# Patient Record
Sex: MALE | Race: BLACK OR AFRICAN AMERICAN | HISPANIC OR LATINO | Employment: FULL TIME | URBAN - METROPOLITAN AREA
[De-identification: names, ages, dates, MRNs, and addresses within clinical notes are randomized per-mention and may not be internally consistent; named-entity substitution may affect disease eponyms.]

---

## 2022-02-08 ENCOUNTER — HOSPITAL ENCOUNTER (EMERGENCY)
Facility: HOSPITAL | Age: 42
Discharge: HOME | End: 2022-02-08
Attending: EMERGENCY MEDICINE
Payer: OTHER GOVERNMENT

## 2022-02-08 VITALS
DIASTOLIC BLOOD PRESSURE: 87 MMHG | RESPIRATION RATE: 14 BRPM | SYSTOLIC BLOOD PRESSURE: 143 MMHG | HEART RATE: 83 BPM | TEMPERATURE: 98 F | OXYGEN SATURATION: 98 %

## 2022-02-08 DIAGNOSIS — R07.89 LEFT-SIDED CHEST WALL PAIN: Primary | ICD-10-CM

## 2022-02-08 DIAGNOSIS — Z20.822 COVID-19 VIRUS NOT DETECTED: ICD-10-CM

## 2022-02-08 DIAGNOSIS — R00.2 PALPITATIONS: ICD-10-CM

## 2022-02-08 LAB
ALBUMIN SERPL BCP-MCNC: 4.3 G/DL (ref 3.5–5.2)
ALP SERPL-CCNC: 54 U/L (ref 55–135)
ALT SERPL W/O P-5'-P-CCNC: 44 U/L (ref 10–44)
ANION GAP SERPL CALC-SCNC: 10 MMOL/L (ref 8–16)
AST SERPL-CCNC: 36 U/L (ref 10–40)
BASOPHILS # BLD AUTO: 0.04 K/UL (ref 0–0.2)
BASOPHILS NFR BLD: 0.7 % (ref 0–1.9)
BILIRUB SERPL-MCNC: 0.3 MG/DL (ref 0.1–1)
BNP SERPL-MCNC: <10 PG/ML (ref 0–99)
BUN SERPL-MCNC: 16 MG/DL (ref 6–20)
CALCIUM SERPL-MCNC: 9.4 MG/DL (ref 8.7–10.5)
CHLORIDE SERPL-SCNC: 105 MMOL/L (ref 95–110)
CO2 SERPL-SCNC: 23 MMOL/L (ref 23–29)
CREAT SERPL-MCNC: 1.4 MG/DL (ref 0.5–1.4)
CTP QC/QA: YES
DIFFERENTIAL METHOD: ABNORMAL
EOSINOPHIL # BLD AUTO: 0.2 K/UL (ref 0–0.5)
EOSINOPHIL NFR BLD: 3.1 % (ref 0–8)
ERYTHROCYTE [DISTWIDTH] IN BLOOD BY AUTOMATED COUNT: 15.2 % (ref 11.5–14.5)
EST. GFR  (AFRICAN AMERICAN): >60 ML/MIN/1.73 M^2
EST. GFR  (NON AFRICAN AMERICAN): >60 ML/MIN/1.73 M^2
GLUCOSE SERPL-MCNC: 140 MG/DL (ref 70–110)
HCT VFR BLD AUTO: 36.8 % (ref 40–54)
HCV AB SERPL QL IA: NEGATIVE
HGB BLD-MCNC: 12.1 G/DL (ref 14–18)
HIV 1+2 AB+HIV1 P24 AG SERPL QL IA: NEGATIVE
IMM GRANULOCYTES # BLD AUTO: 0.04 K/UL (ref 0–0.04)
IMM GRANULOCYTES NFR BLD AUTO: 0.7 % (ref 0–0.5)
LYMPHOCYTES # BLD AUTO: 1.7 K/UL (ref 1–4.8)
LYMPHOCYTES NFR BLD: 30.9 % (ref 18–48)
MCH RBC QN AUTO: 26.1 PG (ref 27–31)
MCHC RBC AUTO-ENTMCNC: 32.9 G/DL (ref 32–36)
MCV RBC AUTO: 79 FL (ref 82–98)
MONOCYTES # BLD AUTO: 0.7 K/UL (ref 0.3–1)
MONOCYTES NFR BLD: 11.8 % (ref 4–15)
NEUTROPHILS # BLD AUTO: 2.9 K/UL (ref 1.8–7.7)
NEUTROPHILS NFR BLD: 52.8 % (ref 38–73)
NRBC BLD-RTO: 0 /100 WBC
PLATELET # BLD AUTO: 203 K/UL (ref 150–450)
PMV BLD AUTO: 10.3 FL (ref 9.2–12.9)
POTASSIUM SERPL-SCNC: 4.1 MMOL/L (ref 3.5–5.1)
PROT SERPL-MCNC: 8.1 G/DL (ref 6–8.4)
RBC # BLD AUTO: 4.64 M/UL (ref 4.6–6.2)
SARS-COV-2 RDRP RESP QL NAA+PROBE: NEGATIVE
SODIUM SERPL-SCNC: 138 MMOL/L (ref 136–145)
TROPONIN I SERPL DL<=0.01 NG/ML-MCNC: 0.01 NG/ML (ref 0–0.03)
WBC # BLD AUTO: 5.5 K/UL (ref 3.9–12.7)

## 2022-02-08 PROCEDURE — 99285 EMERGENCY DEPT VISIT HI MDM: CPT | Mod: 25

## 2022-02-08 PROCEDURE — 93010 ELECTROCARDIOGRAM REPORT: CPT | Mod: ,,, | Performed by: INTERNAL MEDICINE

## 2022-02-08 PROCEDURE — 83880 ASSAY OF NATRIURETIC PEPTIDE: CPT | Performed by: EMERGENCY MEDICINE

## 2022-02-08 PROCEDURE — 93010 EKG 12-LEAD: ICD-10-PCS | Mod: ,,, | Performed by: INTERNAL MEDICINE

## 2022-02-08 PROCEDURE — 93005 ELECTROCARDIOGRAM TRACING: CPT

## 2022-02-08 PROCEDURE — 99285 EMERGENCY DEPT VISIT HI MDM: CPT | Mod: CS,,, | Performed by: EMERGENCY MEDICINE

## 2022-02-08 PROCEDURE — 80053 COMPREHEN METABOLIC PANEL: CPT | Performed by: EMERGENCY MEDICINE

## 2022-02-08 PROCEDURE — 85025 COMPLETE CBC W/AUTO DIFF WBC: CPT | Performed by: EMERGENCY MEDICINE

## 2022-02-08 PROCEDURE — 99285 PR EMERGENCY DEPT VISIT,LEVEL V: ICD-10-PCS | Mod: CS,,, | Performed by: EMERGENCY MEDICINE

## 2022-02-08 PROCEDURE — 25000003 PHARM REV CODE 250: Performed by: EMERGENCY MEDICINE

## 2022-02-08 PROCEDURE — 86803 HEPATITIS C AB TEST: CPT | Performed by: EMERGENCY MEDICINE

## 2022-02-08 PROCEDURE — 84484 ASSAY OF TROPONIN QUANT: CPT | Performed by: EMERGENCY MEDICINE

## 2022-02-08 PROCEDURE — 87389 HIV-1 AG W/HIV-1&-2 AB AG IA: CPT | Performed by: EMERGENCY MEDICINE

## 2022-02-08 PROCEDURE — U0002 COVID-19 LAB TEST NON-CDC: HCPCS | Performed by: EMERGENCY MEDICINE

## 2022-02-08 RX ORDER — NAPROXEN 500 MG/1
500 TABLET ORAL
Status: COMPLETED | OUTPATIENT
Start: 2022-02-08 | End: 2022-02-08

## 2022-02-08 RX ORDER — BACLOFEN 10 MG/1
20 TABLET ORAL
Status: COMPLETED | OUTPATIENT
Start: 2022-02-08 | End: 2022-02-08

## 2022-02-08 RX ADMIN — NAPROXEN 500 MG: 500 TABLET ORAL at 02:02

## 2022-02-08 RX ADMIN — BACLOFEN 20 MG: 10 TABLET ORAL at 02:02

## 2022-02-08 NOTE — ED PROVIDER NOTES
Encounter Date: 2/8/2022       History     Chief Complaint   Patient presents with    Chest Pain     Pt reports 10/10 L sided chest pain that started 1 hour PTA. Denies n/v.      HPI   43 Y/O healthy  non-smoker M C/O localized, reproducible to palpation and movement, sharp left-sided chest pain with no associated dyspnea, diaphoresis, nausea/vomiting or palpitations.  He denies any migratory back pain.  No reported history of PE or DVT.  He reports pain is aggravated versus elicited to torso movement and palpation of site.  He does report nasal congestion with nonpurulent rhinorrhea, sore throat, with no change in voice, drooling or dysphagia to solids or liquids and cough productive of clear sputum, which has aggravated his chest pain.  Prior to symptom onset, he denies any chest pain on exertion, dyspnea on exertion or decreased exercise tolerance.  No family history of sudden death.    Review of patient's allergies indicates:  No Known Allergies  No past medical history on file.  No past surgical history on file.  No family history on file.     Review of Systems   Constitutional: Negative for chills, diaphoresis, fatigue, fever and unexpected weight change.   HENT: Positive for congestion, sinus pressure and sore throat. Negative for drooling, ear discharge, ear pain, facial swelling, nosebleeds, tinnitus and trouble swallowing.    Eyes: Negative for photophobia, pain, discharge and visual disturbance.   Respiratory: Positive for cough. Negative for chest tightness, shortness of breath and stridor.    Cardiovascular: Positive for chest pain. Negative for palpitations and leg swelling.   Gastrointestinal: Negative for abdominal pain, constipation, diarrhea and nausea.   Genitourinary: Negative for difficulty urinating, penile pain and penile swelling.   Musculoskeletal: Negative for arthralgias, gait problem, joint swelling and neck stiffness.   Skin: Negative for color change, rash and wound.    Neurological: Negative for dizziness and headaches.       Physical Exam     Initial Vitals [02/08/22 0144]   BP Pulse Resp Temp SpO2   (!) 143/87 83 14 97.9 °F (36.6 °C) 98 %      MAP       --         Physical Exam    Constitutional: He appears well-developed and well-nourished. He is not diaphoretic. No distress.   HENT:   Head: Normocephalic and atraumatic.   Eyes: Conjunctivae are normal. Right eye exhibits no discharge. Left eye exhibits no discharge. No scleral icterus.   Cardiovascular: Normal rate, regular rhythm and intact distal pulses.   No murmur heard.  Pulmonary/Chest: No stridor. No respiratory distress. He has no wheezes. He has no rhonchi. He has no rales. He exhibits no tenderness.   Abdominal: Abdomen is soft. Bowel sounds are normal. There is no abdominal tenderness.   Musculoskeletal:         General: Normal range of motion.     Neurological: He is alert and oriented to person, place, and time.   Skin: Skin is warm and dry. No rash noted.         ED Course   Procedures  Labs Reviewed   CBC W/ AUTO DIFFERENTIAL - Abnormal; Notable for the following components:       Result Value    Hemoglobin 12.1 (*)     Hematocrit 36.8 (*)     MCV 79 (*)     MCH 26.1 (*)     RDW 15.2 (*)     Immature Granulocytes 0.7 (*)     All other components within normal limits   COMPREHENSIVE METABOLIC PANEL - Abnormal; Notable for the following components:    Glucose 140 (*)     Alkaline Phosphatase 54 (*)     All other components within normal limits   TROPONIN I   B-TYPE NATRIURETIC PEPTIDE   HIV 1 / 2 ANTIBODY    Narrative:     Release to patient->Immediate   HEPATITIS C ANTIBODY    Narrative:     Release to patient->Immediate   SARS-COV-2 RDRP GENE    Narrative:     This test utilizes isothermal nucleic acid amplification   technology to detect the SARS-CoV-2 RdRp nucleic acid segment.   The analytical sensitivity (limit of detection) is 125 genome   equivalents/mL.   A POSITIVE result implies infection with the  SARS-CoV-2 virus;   the patient is presumed to be contagious.     A NEGATIVE result means that SARS-CoV-2 nucleic acids are not   present above the limit of detection. A NEGATIVE result should be   treated as presumptive. It does not rule out the possibility of   COVID-19 and should not be the sole basis for treatment decisions.   If COVID-19 is strongly suspected based on clinical and exposure   history, re-testing using an alternate molecular assay should be   considered.   This test is only for use under the Food and Drug   Administration s Emergency Use Authorization (EUA).   Commercial kits are provided by [employer].   Performance characteristics of the EUA have been independently   verified by [facility] Department of   Pathology and Laboratory Medicine.   _________________________________________________________________   The authorized Fact Sheet for Healthcare Providers and the authorized Fact   Sheet for Patients of the ID NOW COVID-19 are available on the FDA   website:     https://www.fda.gov/media/972772/download  https://www.fda.gov/media/929787/download         EKG Readings: (Independently Interpreted)   Initial Reading: No STEMI.   Sinus rhythm at a rate of 67 beats per minute with normal ventricular axis; AK/QRS/QTC interval within normal limits with no STEMI.     ECG Results          EKG 12-lead (Final result)  Result time 02/08/22 21:51:32    Final result by Interface, [facility] (02/08/22 21:51:32)                 Narrative:    Test Reason : R07.9,    Vent. Rate : 067 BPM     Atrial Rate : 067 BPM     P-R Int : 150 ms          QRS Dur : 090 ms      QT Int : 368 ms       P-R-T Axes : 053 076 007 degrees     QTc Int : 388 ms    Normal sinus rhythm  Nonspecific ST abnormality  Abnormal ECG  No previous ECGs available  Confirmed by Leoncio BRITT, Ladarius MUNIZ ([id number]) on 2/8/2022 9:51:22 PM    Referred By: [id number]   SELF           Confirmed By:Ladarius Fang MD                         "    Imaging Results          X-Ray Chest PA And Lateral (Final result)  Result time 02/08/22 02:57:06    Final result by Sammy Bradford MD (02/08/22 02:57:06)                 Impression:      No acute cardiopulmonary finding.      Electronically signed by: Sammy Bradford MD  Date:    02/08/2022  Time:    02:57             Narrative:    EXAMINATION:  XR CHEST PA AND LATERAL    CLINICAL HISTORY:  Provided history is "Chest Pain;  ".    TECHNIQUE:  Frontal and lateral views of the chest were performed.    COMPARISON:  None.    FINDINGS:  Cardiac silhouette is not enlarged. No focal consolidation.  No sizable pleural effusion.  No pneumothorax.                                 Medications   naproxen tablet 500 mg (500 mg Oral Given 2/8/22 0227)   baclofen tablet 20 mg (20 mg Oral Given 2/8/22 0227)     Medical Decision Making:   History:   Old Medical Records: I decided to obtain old medical records.  Initial Assessment:   Afebrile, atraumatic and hemodynamically stable male presents with signs and symptoms of left-sided chest wall pain.  Unlikely acute coronary syndrome with no STEMI on ECG that may warrant emergent PCI cardiology evaluation.  No hypoxia, dyspnea or tachypnea.  No suspicion for PE as etiology to his reproducible chest wall pain.  Click lungs clear to auscultation with no wheezing and good inspiratory/expiratory effort.  No suspicion for thoracic aortic dissection.  Will provide NSAIDs, obtain labs given the patient's deep concern, but I anticipate outpatient disposition.  He additionally reports non acute and intermittent episodes of palpitation with no syncopal episodes, for which I will refer to Cardiology as an outpatient for event monitor.  Clinical Tests:   Lab Tests: Ordered and Reviewed  Radiological Study: Ordered and Reviewed  Medical Tests: Ordered and Reviewed                      Clinical Impression:   Final diagnoses:  [R07.89] Left-sided chest wall pain (Primary)  [R00.2] " Palpitations  [Z20.822] COVID-19 virus not detected          ED Disposition Condition    Discharge Stable        ED Prescriptions     None        Follow-up Information     Follow up With Specialties Details Why Contact Info    [facility] - [facility] Emergency Medicine  If you develop worsening chest pain with shortness of breath, sweating or any new concerns [address]  [facility] [address]  [phone]           Miguelito Leon MD  02/12/22 0548

## 2022-02-08 NOTE — DISCHARGE INSTRUCTIONS
"Imaging Results              X-Ray Chest PA And Lateral (Final result)  Result time 02/08/22 02:57:06      Final result by Sammy Bradford MD (02/08/22 02:57:06)                   Impression:      No acute cardiopulmonary finding.      Electronically signed by: Sammy Bradford MD  Date:    02/08/2022  Time:    02:57               Narrative:    EXAMINATION:  XR CHEST PA AND LATERAL    CLINICAL HISTORY:  Provided history is "Chest Pain;  ".    TECHNIQUE:  Frontal and lateral views of the chest were performed.    COMPARISON:  None.    FINDINGS:  Cardiac silhouette is not enlarged. No focal consolidation.  No sizable pleural effusion.  No pneumothorax.                                      "

## 2023-09-02 ENCOUNTER — HOSPITAL ENCOUNTER (EMERGENCY)
Facility: HOSPITAL | Age: 43
Discharge: HOME | End: 2023-09-02
Attending: EMERGENCY MEDICINE

## 2023-09-02 VITALS
DIASTOLIC BLOOD PRESSURE: 61 MMHG | WEIGHT: 190 LBS | OXYGEN SATURATION: 98 % | HEIGHT: 66 IN | RESPIRATION RATE: 16 BRPM | BODY MASS INDEX: 30.53 KG/M2 | HEART RATE: 63 BPM | TEMPERATURE: 98 F | SYSTOLIC BLOOD PRESSURE: 134 MMHG

## 2023-09-02 DIAGNOSIS — S39.012A LUMBAR STRAIN, INITIAL ENCOUNTER: Primary | ICD-10-CM

## 2023-09-02 DIAGNOSIS — M54.50 ACUTE BILATERAL LOW BACK PAIN WITHOUT SCIATICA: ICD-10-CM

## 2023-09-02 LAB
ALBUMIN SERPL BCP-MCNC: 4.3 G/DL (ref 3.5–5.2)
ALP SERPL-CCNC: 53 U/L (ref 55–135)
ALT SERPL W/O P-5'-P-CCNC: 52 U/L (ref 10–44)
ANION GAP SERPL CALC-SCNC: 10 MMOL/L (ref 8–16)
AST SERPL-CCNC: 41 U/L (ref 10–40)
BASOPHILS # BLD AUTO: 0.04 K/UL (ref 0–0.2)
BASOPHILS NFR BLD: 0.7 % (ref 0–1.9)
BILIRUB SERPL-MCNC: 0.7 MG/DL (ref 0.1–1)
BILIRUB UR QL STRIP: NEGATIVE
BUN SERPL-MCNC: 20 MG/DL (ref 6–20)
CALCIUM SERPL-MCNC: 9.7 MG/DL (ref 8.7–10.5)
CHLORIDE SERPL-SCNC: 105 MMOL/L (ref 95–110)
CLARITY UR REFRACT.AUTO: CLEAR
CO2 SERPL-SCNC: 25 MMOL/L (ref 23–29)
COLOR UR AUTO: NORMAL
CREAT SERPL-MCNC: 1.2 MG/DL (ref 0.5–1.4)
DIFFERENTIAL METHOD: ABNORMAL
EOSINOPHIL # BLD AUTO: 0.1 K/UL (ref 0–0.5)
EOSINOPHIL NFR BLD: 1.5 % (ref 0–8)
ERYTHROCYTE [DISTWIDTH] IN BLOOD BY AUTOMATED COUNT: 14.9 % (ref 11.5–14.5)
EST. GFR  (NO RACE VARIABLE): >60 ML/MIN/1.73 M^2
GLUCOSE SERPL-MCNC: 80 MG/DL (ref 70–110)
GLUCOSE UR QL STRIP: NEGATIVE
HCT VFR BLD AUTO: 38.4 % (ref 40–54)
HCV AB SERPL QL IA: NORMAL
HGB BLD-MCNC: 12.8 G/DL (ref 14–18)
HGB UR QL STRIP: NEGATIVE
HIV 1+2 AB+HIV1 P24 AG SERPL QL IA: NORMAL
IMM GRANULOCYTES # BLD AUTO: 0.01 K/UL (ref 0–0.04)
IMM GRANULOCYTES NFR BLD AUTO: 0.2 % (ref 0–0.5)
KETONES UR QL STRIP: NEGATIVE
LEUKOCYTE ESTERASE UR QL STRIP: NEGATIVE
LYMPHOCYTES # BLD AUTO: 1.6 K/UL (ref 1–4.8)
LYMPHOCYTES NFR BLD: 28.9 % (ref 18–48)
MCH RBC QN AUTO: 27.6 PG (ref 27–31)
MCHC RBC AUTO-ENTMCNC: 33.3 G/DL (ref 32–36)
MCV RBC AUTO: 83 FL (ref 82–98)
MONOCYTES # BLD AUTO: 0.5 K/UL (ref 0.3–1)
MONOCYTES NFR BLD: 8.4 % (ref 4–15)
NEUTROPHILS # BLD AUTO: 3.2 K/UL (ref 1.8–7.7)
NEUTROPHILS NFR BLD: 60.3 % (ref 38–73)
NITRITE UR QL STRIP: NEGATIVE
NRBC BLD-RTO: 0 /100 WBC
PH UR STRIP: 5 [PH] (ref 5–8)
PLATELET # BLD AUTO: 165 K/UL (ref 150–450)
PMV BLD AUTO: 11.1 FL (ref 9.2–12.9)
POTASSIUM SERPL-SCNC: 4.3 MMOL/L (ref 3.5–5.1)
PROT SERPL-MCNC: 7.4 G/DL (ref 6–8.4)
PROT UR QL STRIP: NEGATIVE
RBC # BLD AUTO: 4.64 M/UL (ref 4.6–6.2)
SODIUM SERPL-SCNC: 140 MMOL/L (ref 136–145)
SP GR UR STRIP: 1.01 (ref 1–1.03)
URN SPEC COLLECT METH UR: NORMAL
WBC # BLD AUTO: 5.36 K/UL (ref 3.9–12.7)

## 2023-09-02 PROCEDURE — 81003 URINALYSIS AUTO W/O SCOPE: CPT | Performed by: EMERGENCY MEDICINE

## 2023-09-02 PROCEDURE — 87389 HIV-1 AG W/HIV-1&-2 AB AG IA: CPT | Performed by: PHYSICIAN ASSISTANT

## 2023-09-02 PROCEDURE — 86803 HEPATITIS C AB TEST: CPT | Performed by: PHYSICIAN ASSISTANT

## 2023-09-02 PROCEDURE — 80053 COMPREHEN METABOLIC PANEL: CPT | Performed by: EMERGENCY MEDICINE

## 2023-09-02 PROCEDURE — 96374 THER/PROPH/DIAG INJ IV PUSH: CPT

## 2023-09-02 PROCEDURE — 85025 COMPLETE CBC W/AUTO DIFF WBC: CPT | Performed by: EMERGENCY MEDICINE

## 2023-09-02 PROCEDURE — 96361 HYDRATE IV INFUSION ADD-ON: CPT

## 2023-09-02 PROCEDURE — 99285 EMERGENCY DEPT VISIT HI MDM: CPT | Mod: 25

## 2023-09-02 PROCEDURE — 63600175 PHARM REV CODE 636 W HCPCS: Performed by: EMERGENCY MEDICINE

## 2023-09-02 RX ORDER — CYCLOBENZAPRINE HCL 10 MG
10 TABLET ORAL 3 TIMES DAILY PRN
Qty: 15 TABLET | Refills: 0 | Status: SHIPPED | OUTPATIENT
Start: 2023-09-02 | End: 2023-09-07

## 2023-09-02 RX ORDER — IBUPROFEN 600 MG/1
600 TABLET ORAL EVERY 6 HOURS PRN
Qty: 20 TABLET | Refills: 0 | Status: SHIPPED | OUTPATIENT
Start: 2023-09-02

## 2023-09-02 RX ORDER — KETOROLAC TROMETHAMINE 30 MG/ML
10 INJECTION, SOLUTION INTRAMUSCULAR; INTRAVENOUS
Status: COMPLETED | OUTPATIENT
Start: 2023-09-02 | End: 2023-09-02

## 2023-09-02 RX ADMIN — SODIUM CHLORIDE, POTASSIUM CHLORIDE, SODIUM LACTATE AND CALCIUM CHLORIDE 1000 ML: 600; 310; 30; 20 INJECTION, SOLUTION INTRAVENOUS at 09:09

## 2023-09-02 RX ADMIN — KETOROLAC TROMETHAMINE 10 MG: 30 INJECTION, SOLUTION INTRAMUSCULAR; INTRAVENOUS at 09:09

## 2023-09-02 NOTE — DISCHARGE INSTRUCTIONS
Use warm compresses to help relieve the pain in your back.  Take ibuprofen every 6 hours as needed for pain.  If the pain is present despite ibuprofen, you may use cyclobenzaprine.  This is a muscle relaxant and will make you drowsy so do not take it before driving or operating heavy machinery.  Return to the ER for any severe pain, fever, weakness, numbness, difficulty controlling your bowels or bladder.

## 2023-09-02 NOTE — ED PROVIDER NOTES
Encounter Date: 9/2/2023       History     Chief Complaint   Patient presents with    Back Pain     Bilateral lower back pain x 2 days, pain in lower back when you pee     44 yo M with pmhx pre-DM presents with a chief complaint of LBP. Patient reports pain in bilat lower back. Onset yesterday AM. Contant.  Worsened by movement.  No heavy lifting.  He reports multiple previous episodes over the past 2 months but has never seen a physician for them.  At triage he noted some pain with urination.  However, patient clarifies that when standing to urinate he has pain in his back as standing causes pain.  He denies any actual dysuria or urinary frequency or hematuria.  No abdominal pain, nausea, vomiting.  He tried Tylenol without relief of symptoms.  No nausea, vomiting, fever.        Review of patient's allergies indicates:  No Known Allergies  History reviewed. No pertinent past medical history.  No past surgical history on file.  History reviewed. No pertinent family history.     Review of Systems    Physical Exam     Initial Vitals [09/02/23 0815]   BP Pulse Resp Temp SpO2   134/61 63 16 98.3 °F (36.8 °C) 98 %      MAP       --         Physical Exam    Nursing note and vitals reviewed.  Constitutional: He appears well-developed and well-nourished. He is not diaphoretic. No distress.   HENT:   Head: Normocephalic and atraumatic.   Eyes: Right eye exhibits no discharge. Left eye exhibits no discharge. No scleral icterus.   Neck: Neck supple. No JVD present.   Normal range of motion.  Cardiovascular:  Normal rate, regular rhythm and normal heart sounds.     Exam reveals no gallop and no friction rub.       No murmur heard.  Pulmonary/Chest: Breath sounds normal. No respiratory distress. He has no wheezes. He has no rhonchi. He has no rales.   Abdominal: Abdomen is soft. He exhibits no distension and no mass. There is no abdominal tenderness.   L>R CVA tenderness There is no rebound and no guarding.   Musculoskeletal:          General: No tenderness. Normal range of motion.      Cervical back: Normal range of motion and neck supple.      Comments: No midline lumbar tenderness  There is paraspinal lumbar tenderness bilaterally     Neurological: He is alert and oriented to person, place, and time. He has normal strength. No sensory deficit.   Skin: Skin is warm and dry. Capillary refill takes less than 2 seconds.   Psychiatric: Thought content normal.         ED Course   Procedures  Labs Reviewed   CBC W/ AUTO DIFFERENTIAL - Abnormal; Notable for the following components:       Result Value    Hemoglobin 12.8 (*)     Hematocrit 38.4 (*)     RDW 14.9 (*)     All other components within normal limits   COMPREHENSIVE METABOLIC PANEL - Abnormal; Notable for the following components:    Alkaline Phosphatase 53 (*)     AST 41 (*)     ALT 52 (*)     All other components within normal limits   HIV 1 / 2 ANTIBODY    Narrative:     Release to patient->Immediate   HEPATITIS C ANTIBODY    Narrative:     Release to patient->Immediate   URINALYSIS, REFLEX TO URINE CULTURE    Narrative:     Specimen Source->Urine          Imaging Results              CT Renal Stone Study ABD Pelvis WO (Final result)  Result time 09/02/23 11:02:32      Final result by Sravanthi Camacho MD (09/02/23 11:02:32)                   Impression:      No nephrolithiasis, ureterolithiasis, hydronephrosis or hydroureter.    Constipation.      Electronically signed by: Sravanthi Camacho MD  Date:    09/02/2023  Time:    11:02               Narrative:    EXAMINATION:  CT RENAL STONE STUDY ABD PELVIS WO    CLINICAL HISTORY:  Flank pain, kidney stone suspected;    TECHNIQUE:  Low dose axial images, sagittal and coronal reformations were obtained from the lung bases to the pubic symphysis.  Contrast was not administered.    COMPARISON:  None    FINDINGS:  The lung bases are clear.  The base of the heart appears normal.  The aorta is of normal caliber and tapers  appropriately.    Suspected biliary sludge.  No intrahepatic or extrahepatic biliary ductal dilatation is identified.  The unenhanced liver, spleen, pancreas, adrenal glands and kidneys are normal in size, shape and contour.  No nephrolithiasis, ureterolithiasis, hydronephrosis or hydroureter is seen.  The urinary bladder is well distended and appears normal.  Prostate appears normal.    Constipation.  The appendix cannot be clearly identified; however, no inflammatory changes are seen adjacent to the base of the cecum to suggest appendicitis.  No free air, free fluid or obstruction.  No pathologically enlarged abdominal or pelvic lymph nodes are seen.    Age-appropriate degenerative changes affect the skeleton.                                       Medications   ketorolac injection 9.999 mg (9.999 mg Intravenous Given 9/2/23 8087)   lactated ringers bolus 1,000 mL (0 mLs Intravenous Stopped 9/2/23 1103)     Medical Decision Making  42 yo M with pmhx pre-DM presents with a chief complaint of LBP.    Differential includes, but is not limited to:  Pyelonephritis, nephrolithiasis, muscular strain    No midline tenderness, fever, doubt epidural abscess/diskitis.  No lower extremity weakness, bowel or bladder incontinence, midline tenderness, doubt cauda equina.    Will administer IV fluids and ketorolac.  Will obtain labs and CT renal stone.    Reassessment:  CBC without leukocytosis.  Hemoglobin 12.8.  CMP with slightly elevated LFTs but patient has a history of this.  Creatinine normal at 1.2.  UA is negative for blood or infection.  CT negative for any emergent abnormality.  On repeat assessment, patient reports moderate improvement.  When questioned further about possible heavy lifting, patient does admit to working out lately.  I suspect his symptoms are secondary to muscular etiology.  Patient encouraged warm compresses, NSAIDs.  He was also provided with a script for antispasmodics with precautions.  Instructions  for symptomatic treatment.  Return precautions.  He is comfortable with discharge.    Amount and/or Complexity of Data Reviewed  Labs: ordered.  Radiology: ordered.    Risk  Prescription drug management.                               Clinical Impression:   Final diagnoses:  [S39.012A] Lumbar strain, initial encounter (Primary)  [M54.50] Acute bilateral low back pain without sciatica        ED Disposition Condition    Discharge Stable          ED Prescriptions       Medication Sig Dispense Start Date End Date Auth. Provider    ibuprofen (ADVIL,MOTRIN) 600 MG tablet Take 1 tablet (600 mg total) by mouth every 6 (six) hours as needed for Pain. 20 tablet 9/2/2023 -- Bin Cornejo MD    cyclobenzaprine (FLEXERIL) 10 MG tablet Take 1 tablet (10 mg total) by mouth 3 (three) times daily as needed for Muscle spasms. 15 tablet 9/2/2023 9/7/2023 Bin Cornejo MD          Follow-up Information       Follow up With Specialties Details Why Contact Info    [facility] - Emergency Dept Emergency Medicine  As needed, If symptoms worsen [address]  [facility] [address] [phone]             Bin Cornejo MD  09/02/23 3673

## 2023-09-02 NOTE — ED NOTES
C/C: 43 y.o. male arrived to the ED via POV for c/o flank pain. Pt reports acute onset of bilateral flank pain yesterday, 9/1/23, accompanied by urinary frequency and subjective fever. Pt reports severe discomfort when laying down and upon ambulation. Denies night sweats, hematuria, dysuria, or abdominal pain.     APPEARANCE: awake, alert, and appears to be in severe discomfort. Pain score 10/10.   SKIN: warm, dry and intact. No breakdown or bruising. +yellowing to bilateral palms   MUSCULOSKELETAL: generalized weakness. Patient moving all extremities spontaneously, no obvious swelling or deformities noted. Ambulates independently.  RESPIRATORY: Airway open. Denies shortness of breath. Respirations unlabored.   CARDIAC: Denies CP, 2+ distal pulses; no peripheral edema  ABDOMEN: S/ND/NT, Denies N/V/D.  : Pt voids spontaneously, +frequency. Denies hematuria, urgency, or incontinence   NEUROLOGIC: AAO x 4; speaking and following commands appropriately. Equal strength in all extremities; denies numbness/tingling.

## 2024-03-31 ENCOUNTER — HOSPITAL ENCOUNTER (EMERGENCY)
Facility: HOSPITAL | Age: 44
Discharge: HOME | End: 2024-03-31
Attending: EMERGENCY MEDICINE

## 2024-03-31 VITALS
SYSTOLIC BLOOD PRESSURE: 146 MMHG | HEART RATE: 75 BPM | OXYGEN SATURATION: 99 % | HEIGHT: 66 IN | DIASTOLIC BLOOD PRESSURE: 73 MMHG | WEIGHT: 190 LBS | TEMPERATURE: 98 F | BODY MASS INDEX: 30.53 KG/M2 | RESPIRATION RATE: 17 BRPM

## 2024-03-31 DIAGNOSIS — R07.9 CHEST PAIN, UNSPECIFIED TYPE: Primary | ICD-10-CM

## 2024-03-31 DIAGNOSIS — R06.02 SHORTNESS OF BREATH: ICD-10-CM

## 2024-03-31 DIAGNOSIS — F41.9 ANXIETY: ICD-10-CM

## 2024-03-31 DIAGNOSIS — R07.9 CHEST PAIN: ICD-10-CM

## 2024-03-31 PROCEDURE — 93010 ELECTROCARDIOGRAM REPORT: CPT | Mod: ,,, | Performed by: INTERNAL MEDICINE

## 2024-03-31 PROCEDURE — 93005 ELECTROCARDIOGRAM TRACING: CPT

## 2024-03-31 PROCEDURE — 99284 EMERGENCY DEPT VISIT MOD MDM: CPT | Mod: 25

## 2024-03-31 NOTE — ED PROVIDER NOTES
Encounter Date: 3/31/2024       History     Chief Complaint   Patient presents with    Shortness of Breath     Patient presents for shortness of breath while at the laundry mat. +marijuana use      The patient is a 44-year-old male who presents to the emergency department with chest pain.  He states that the symptoms began earlier today.  The pain is retrosternal.  It is moderate in severity.  It is associated with palpitations and shortness of breath.  He has used marijuana with some relief of his symptoms.  He states that he has had similar symptoms approximately every 2-4 weeks and has been seen in emergency departments on a regular basis for these issues.  He states that he has been diagnosed with anxiety.  He believes that his current symptoms are consistent with an anxiety attack.  He has no other complaints.      Review of patient's allergies indicates:  No Known Allergies  History reviewed. No pertinent past medical history.  History reviewed. No pertinent surgical history.  History reviewed. No pertinent family history.     Review of Systems  General: Denies fever.  Denies chills.  Denies generalized weakness.  HENT: Denies sore throat.  Denies earache.  Denies rhinorrhea.  Eyes: Denies visual changes.  Denies eye pain.  Denies drainage.  Cardiovascular:  Reports chest pain.  Denies shortness of breath.  Denies orthopnea.  Denies dyspnea on exertion.  Reports palpitations.  Respiratory:  Reports shortness of breath.  Denies wheezing.  Denies coughing.  GI: Denies abdominal pain.  Denies nausea.  Denies vomiting.  Denies diarrhea.  Denies constipation.  Denies melena.  Denies hematochezia.  : Denies dysuria.  Denies hematuria.  Denies pelvic pain.  Denies swelling.  Skin: Denies rashes.  Denies lesions.  Denies pallor.  Neuro: Denies headache.  Denies head trauma.  Denies numbness.  Denies focal weakness.  Musculoskeletal: Denies neck pain.  Denies back pain.  Denies extremity pain.  Denies extremity  swelling.  Psych: Denies depression.  Denies suicidal ideation.  Denies homicidal ideation.  Denies auditory hallucinations.  Denies visual hallucinations.  Reports anxiety.      Physical Exam     Initial Vitals [03/31/24 1216]   BP Pulse Resp Temp SpO2   (!) 146/73 75 17 98.3 °F (36.8 °C) 99 %      MAP       --         Physical Exam  General: No apparent distress.  Well-nourished.  Well-developed.  Alert and oriented x3.  HENT: Moist mucous membranes.  Normocephalic atraumatic.  Oropharynx clear.    Eyes: Pupils equally round and reactive to light.  Extraocular movements intact.  No scleral icterus.  No conjunctival pallor.  Cardiovascular: Regular rate and rhythm.  No murmurs, rubs, or gallops.  Brisk capillary fill.  2+ distal pulses.  No chest wall tenderness to palpation or crepitus.  Respiratory: Clear to auscultation bilaterally.  No wheezes, rales, or rhonchi.  No respiratory distress.  Abdomen: Soft.  Nontender.  Nondistended.  No guarding.  No rebound.  No masses.  No abdominal bruit auscultated.  Skin: No rashes.  No lesions.  No pallor.  No jaundice.  Neuro: Cranial nerves II through XII grossly intact.  Moving all extremities equally.  No sensory deficits.  Strength 5 out of 5 in all 4 extremities.  Musculoskeletal: Neck supple.  No extremity tenderness.  Moving all extremities without pain.  No back tenderness.  No neck tenderness.  Negative Homans sign.  No palpable cord.  No calf tenderness.      ED Course   Procedures  Labs Reviewed - No data to display  EKG Readings: (Independently Interpreted)   I independently interpreted this EKG.  Normal sinus rhythm with a rate of 67.  Normal axis.  Normal intervals.  No ischemic changes.         Imaging Results              X-Ray Chest PA And Lateral (Final result)  Result time 03/31/24 13:32:53      Final result by Marques Linder MD (03/31/24 13:32:53)                   Impression:      No acute abnormality.      Electronically signed by: Marques  MD Kailash  Date:    03/31/2024  Time:    13:32               Narrative:    EXAMINATION:  XR CHEST PA AND LATERAL    CLINICAL HISTORY:  Chest pain, unspecified    TECHNIQUE:  PA and lateral views of the chest were performed.    COMPARISON:  Chest radiograph from 02/08/2022    FINDINGS:  The lungs are clear, with normal appearance of pulmonary vasculature and no pleural effusion or pneumothorax.    The cardiac silhouette is normal in size. The hilar and mediastinal contours are unremarkable.    Bones are intact.                                    X-Rays:   Independently Interpreted Readings:   Chest X-Ray: I independently interpreted this chest x-ray.  No cardiomegaly, wide mediastinum, pneumothorax, infiltrate, or effusion noted.     Medications - No data to display  Medical Decision Making  This is an emergent evaluation.  The patient presents with chest pain.  Although he has no cardiac risk factors, I will check a chest x-ray and EKG.  Clinically, I believe that his symptoms are likely due to anxiety.  If his EKG indicates any abnormalities, a cardiac workup may be ordered.  If the chest x-ray shows any abnormalities, I will address accordingly.  I will reassess.    12:41 p.m.   EKG is normal.  Chest x-ray is pending.  I will reassess.    1:50 p.m.  EKG is normal.  Chest x-ray shows no acute abnormalities.  Clinically, I believe the patient's symptoms are secondary to anxiety.  When I attempted to discuss the findings with the patient, I noted that he had eloped.  My plan was to instruct him to follow up closely with his primary care physician.  I will print discharge paperwork to this effect, in the event that the patient does returned.  If that occurs, I will discuss this with the patient.    Amount and/or Complexity of Data Reviewed  Radiology: ordered.                                      Clinical Impression:  Final diagnoses:  [R06.02] Shortness of breath  [R07.9] Chest pain  [R07.9] Chest pain, unspecified  type (Primary)  [F41.9] Anxiety          ED Disposition Condition    Discharge Stable          ED Prescriptions    None       Follow-up Information       Follow up With Specialties Details Why Contact Info Additional Information    [facility] Internal Medicine In 2 days  [address]  [facility] [address] [phone] [facility] Please park in surface lot and check in at central registration desk             Cayden Zepeda MD  03/31/24 1353       Cayden Zepeda MD  03/31/24 1400

## 2024-03-31 NOTE — ED TRIAGE NOTES
43 y/o M presents to ER with c/c SOB since 1030 this morning after smoking marijuana. Denies other drug use. Endorses midsternal c/p ranked 10/10.

## 2024-03-31 NOTE — ED NOTES
Patient identifiers for Agus Palacios 44 y.o. male checked and correct.  Chief Complaint   Patient presents with    Shortness of Breath     Patient presents for shortness of breath while at the laundry mat. +marijuana use      No past medical history on file.  Allergies reported: Review of patient's allergies indicates:  No Known Allergies      LOC: Patient is awake, alert, and aware of environment with an appropriate affect. Patient is oriented x 4 and speaking appropriately.  APPEARANCE: Patient resting comfortably and in no acute distress. Patient is clean and well groomed, patient's clothing is properly fastened.  HEENT: - JVD, + midline trach  SKIN: The skin is warm and dry. Patient has normal skin turgor and moist mucus membranes.   MUSKULOSKELETAL: Patient is moving all extremities well, no obvious deformities noted. Pulses intact. PMS x 4  RESPIRATORY: Airway is open and patent. Respirations are spontaneous and non-labored with normal effort and rate. = CBBS. Endorses SOB.  CARDIAC:  No peripheral edema noted. Endorses 10/10 midsternal, needle quality c/p.  ABDOMEN: No distention noted. Soft and non-tender upon palpation.  NEUROLOGICAL: pupils 4 mm, PERRL. Facial expression is symmetrical. Hand grasps are equal bilaterally. Normal sensation in all extremities when touched with finger.

## 2024-03-31 NOTE — PROVIDER PROGRESS NOTES - EMERGENCY DEPT.
Encounter Date: 3/31/2024    ED Physician Progress Notes         EKG - STEMI Decision  Initial Reading: No STEMI present.

## 2024-04-01 LAB
OHS QRS DURATION: 76 MS
OHS QTC CALCULATION: 386 MS

## 2024-06-10 ENCOUNTER — HOSPITAL ENCOUNTER (EMERGENCY)
Facility: HOSPITAL | Age: 44
Discharge: HOME | End: 2024-06-10
Attending: STUDENT IN AN ORGANIZED HEALTH CARE EDUCATION/TRAINING PROGRAM

## 2024-06-10 VITALS
BODY MASS INDEX: 25.56 KG/M2 | OXYGEN SATURATION: 100 % | RESPIRATION RATE: 16 BRPM | SYSTOLIC BLOOD PRESSURE: 139 MMHG | HEART RATE: 61 BPM | TEMPERATURE: 98 F | HEIGHT: 72 IN | DIASTOLIC BLOOD PRESSURE: 72 MMHG | WEIGHT: 188.69 LBS

## 2024-06-10 DIAGNOSIS — F41.9 ANXIETY: Primary | ICD-10-CM

## 2024-06-10 DIAGNOSIS — R00.2 PALPITATIONS: ICD-10-CM

## 2024-06-10 DIAGNOSIS — R06.02 SHORTNESS OF BREATH: ICD-10-CM

## 2024-06-10 LAB
ALBUMIN SERPL BCP-MCNC: 4.3 G/DL (ref 3.5–5.2)
ALP SERPL-CCNC: 56 U/L (ref 55–135)
ALT SERPL W/O P-5'-P-CCNC: 27 U/L (ref 10–44)
ANION GAP SERPL CALC-SCNC: 10 MMOL/L (ref 8–16)
AST SERPL-CCNC: 25 U/L (ref 10–40)
BASOPHILS # BLD AUTO: 0.03 K/UL (ref 0–0.2)
BASOPHILS NFR BLD: 0.4 % (ref 0–1.9)
BILIRUB SERPL-MCNC: 0.6 MG/DL (ref 0.1–1)
BNP SERPL-MCNC: <10 PG/ML (ref 0–99)
BUN SERPL-MCNC: 17 MG/DL (ref 6–20)
CALCIUM SERPL-MCNC: 9.9 MG/DL (ref 8.7–10.5)
CHLORIDE SERPL-SCNC: 104 MMOL/L (ref 95–110)
CO2 SERPL-SCNC: 25 MMOL/L (ref 23–29)
CREAT SERPL-MCNC: 1.3 MG/DL (ref 0.5–1.4)
DIFFERENTIAL METHOD BLD: ABNORMAL
EOSINOPHIL # BLD AUTO: 0.1 K/UL (ref 0–0.5)
EOSINOPHIL NFR BLD: 0.9 % (ref 0–8)
ERYTHROCYTE [DISTWIDTH] IN BLOOD BY AUTOMATED COUNT: 14 % (ref 11.5–14.5)
EST. GFR  (NO RACE VARIABLE): >60 ML/MIN/1.73 M^2
GLUCOSE SERPL-MCNC: 81 MG/DL (ref 70–110)
HCT VFR BLD AUTO: 39.5 % (ref 40–54)
HGB BLD-MCNC: 13.4 G/DL (ref 14–18)
IMM GRANULOCYTES # BLD AUTO: 0.03 K/UL (ref 0–0.04)
IMM GRANULOCYTES NFR BLD AUTO: 0.4 % (ref 0–0.5)
LYMPHOCYTES # BLD AUTO: 1.5 K/UL (ref 1–4.8)
LYMPHOCYTES NFR BLD: 22.3 % (ref 18–48)
MCH RBC QN AUTO: 27.6 PG (ref 27–31)
MCHC RBC AUTO-ENTMCNC: 33.9 G/DL (ref 32–36)
MCV RBC AUTO: 81 FL (ref 82–98)
MONOCYTES # BLD AUTO: 0.5 K/UL (ref 0.3–1)
MONOCYTES NFR BLD: 7.1 % (ref 4–15)
NEUTROPHILS # BLD AUTO: 4.6 K/UL (ref 1.8–7.7)
NEUTROPHILS NFR BLD: 68.9 % (ref 38–73)
NRBC BLD-RTO: 0 /100 WBC
PLATELET # BLD AUTO: 180 K/UL (ref 150–450)
PMV BLD AUTO: 10.7 FL (ref 9.2–12.9)
POTASSIUM SERPL-SCNC: 4 MMOL/L (ref 3.5–5.1)
PROT SERPL-MCNC: 7.9 G/DL (ref 6–8.4)
RBC # BLD AUTO: 4.85 M/UL (ref 4.6–6.2)
SODIUM SERPL-SCNC: 139 MMOL/L (ref 136–145)
TROPONIN I SERPL DL<=0.01 NG/ML-MCNC: <0.006 NG/ML (ref 0–0.03)
WBC # BLD AUTO: 6.74 K/UL (ref 3.9–12.7)

## 2024-06-10 PROCEDURE — 85025 COMPLETE CBC W/AUTO DIFF WBC: CPT | Performed by: EMERGENCY MEDICINE

## 2024-06-10 PROCEDURE — 99284 EMERGENCY DEPT VISIT MOD MDM: CPT | Mod: 25

## 2024-06-10 PROCEDURE — 84484 ASSAY OF TROPONIN QUANT: CPT | Performed by: EMERGENCY MEDICINE

## 2024-06-10 PROCEDURE — 25000003 PHARM REV CODE 250: Performed by: STUDENT IN AN ORGANIZED HEALTH CARE EDUCATION/TRAINING PROGRAM

## 2024-06-10 PROCEDURE — 93005 ELECTROCARDIOGRAM TRACING: CPT

## 2024-06-10 PROCEDURE — 80053 COMPREHEN METABOLIC PANEL: CPT | Performed by: EMERGENCY MEDICINE

## 2024-06-10 PROCEDURE — 93010 ELECTROCARDIOGRAM REPORT: CPT | Mod: ,,, | Performed by: INTERNAL MEDICINE

## 2024-06-10 PROCEDURE — 83880 ASSAY OF NATRIURETIC PEPTIDE: CPT | Performed by: EMERGENCY MEDICINE

## 2024-06-10 RX ORDER — HYDROXYZINE PAMOATE 25 MG/1
25 CAPSULE ORAL
Status: COMPLETED | OUTPATIENT
Start: 2024-06-10 | End: 2024-06-10

## 2024-06-10 RX ADMIN — HYDROXYZINE PAMOATE 25 MG: 25 CAPSULE ORAL at 07:06

## 2024-06-10 NOTE — DISCHARGE INSTRUCTIONS
You were evaluated in the emergency department today for anxiety and palpitations related to that anxiety.  Although there were no findings of concern to necessitate admission to the hospital or warrant immediate surgical intervention, disease exists on a spectrum and your disease process may progress.  If this is the case, please watch your symptoms and return to the emergency department if you feel worse and are unable to discuss care with your primary care doctor in follow up in the next several days.  Specific information regarding your complaint has been provided.  Thank you for choosing [facility]!    You were seen in the emergency department for palpitations that you relate to stress and anxiety.  You need to follow up with Psychology, you were given 2 referrals, with [facility] versus our facilities.  You can follow up with them at your leisure.    Return to the emergency department if you feel that you have any associated chest pain, difficulty breathing, worsening palpitations.  If you feel that this is persistent and you need to follow up with Cardiology again, the number has been provided for you.

## 2024-06-10 NOTE — FIRST PROVIDER EVALUATION
Medical screening examination initiated.  I have conducted a focused provider triage encounter, findings are as follows:    Brief history of present illness:      SOB, palpitations since this AM  +coughing up bright red blood  Nonsmoker, no asthma  No hx DVT/PE    There were no vitals filed for this visit.    Pertinent physical exam:      Well appearing  NAD  Normal WOB, no hypoxia    Brief workup plan:  EKG, labs, CXR    Preliminary workup initiated; this workup will be continued and followed by the physician or advanced practice provider that is assigned to the patient when roomed.

## 2024-06-10 NOTE — Clinical Note
"Agus Crystal"Philip was seen and treated in our emergency department on 6/10/2024.  He may return to work on 06/11/2024.       If you have any questions or concerns, please don't hesitate to call.      Joel Gerard RN    "

## 2024-06-10 NOTE — ED PROVIDER NOTES
Source of History  patient and EMR    Chief Complaint    multiple complaints (Feels like  heart skipping, coughed up  little blood, sore throat, )      History of Present Illness    Agus Palacios is a 44 y.o. male presenting with concerns for chest pain.  Patient states he has prediabetes but stopped taking metformin because it did not make him feel well.  He also has palpitations.  Feels like his heart is skipping, whenever he feels extremely anxious or stressed.  No chest pain or shortness of breath.  He notes that this happens maybe once or twice a month, in which he then goes to the emergency department to be evaluated.  He has previously been evaluated by cardiology in which he had a Holter monitor.  He states that this was unremarkable for any dysrhythmia so he was no longer being followed by them.  He states he is not on any medication for this, and is not on any medication for anxiety.  He was never seen Psychiatry or Psychology/counselor.  Triage note states the patient had a sore throat and maybe coughed up a little bit of blood, but the patient states he had a sore throat maybe earlier today, but then it self resolved.  No intervention.  Has no complaints at this time.    Patient states he was at [facility] waiting room since about 7:00 a.m. this morning but left because it was taking too long, so he presented here for evaluation.    Review of Systems    As per HPI and below:  Constitutional symptoms:  No fever, no weakness, no chills, no sweats  Skin symptoms:  No rash    Eye symptoms:  No blurred vision  Respiratory symptoms:  No shortness of breath, no dyspnea on exertion, no wheeze, resolved cough and sore throat  Cardiovascular symptoms:  No chest pain, no leg swelling, positive palpitations, no syncope  Gastrointestinal symptoms:  No abdominal pain, no nausea, no vomiting  Musculoskeletal symptoms:  No back pain, No joint pains or aches    Neurologic symptoms:  No headache, no weakness  Hematologic  symptoms:  No bleeding  Psychiatric symptoms:  No substance abuse      Past History    As per HPI and below:  No past medical history on file.    No past surgical history on file.    Social History     Socioeconomic History    Marital status: Significant Other       No family history on file.    Review of patient's allergies indicates:  No Known Allergies    No current facility-administered medications on file prior to encounter.     Current Outpatient Medications on File Prior to Encounter   Medication Sig Dispense Refill    ibuprofen (ADVIL,MOTRIN) 600 MG tablet Take 1 tablet (600 mg total) by mouth every 6 (six) hours as needed for Pain. 20 tablet 0       Physical Exam    Reviewed nursing notes.  Vitals:    06/10/24 1627 06/10/24 1914   BP: 127/61 139/72   BP Location:  Left arm   Patient Position:  Sitting   Pulse: 68 61   Resp: 18 16   Temp: 97.9 °F (36.6 °C) 97.7 °F (36.5 °C)   TempSrc: Oral Oral   SpO2: 99% 100%   Weight: 85.6 kg (188 lb 11.4 oz)    Height: 6' (1.829 m)      General:  Alert, no acute distress.    Skin:  Warm, dry, intact.  No rash.  Head:  Normocephalic, atraumatic.    Neck:  Supple.   HEENT:  Pupils are equal and round, appropriate for room, extraocular movements are intact.  Normal phonation.  Moist mucous membranes.  Normal oropharynx.  Uvula midline.  Cardiovascular:  Regular rate and rhythm, Normal peripheral perfusion, No edema.    Respiratory:  Lungs are clear to auscultation, respirations are non-labored, breath sounds are equal.    Gastrointestinal:  Soft, Nontender, Non distended.   Back:  Nontender. Normal gait.  Ambulatory.  Musculoskeletal:  Normal range of motion observed.  Neurological:  Alert and oriented to person, place, time, and situation.  No focal deficits observed.   Psychiatric:  Cooperative, appropriate mood & affect.       Initial MDM and Data Review    44 y.o. male presenting for evaluation of concern for palpitations, feeling that his heart is skipping, states it  happens about once a month when he feels extremely stressed.  He denies ever having seen Psychiatry, but states he had been referred in the past because he was told this could be anxiety or panic.  Previous evaluation by Cardiology with a Holter monitor her head yielded no significant dysrhythmias, according to patient    Differential includes but is not limited to:  Palpitations, electrolyte disturbance, less likely acute coronary syndrome, doubt pulmonary embolism given normal vital signs and no significant history to suggest this as the most likely diagnosis, anxiety/panic disorder certainly possible given that this happens about once a month in association with extreme stress according to the patient    Work-up includes:  CBC, troponin x1, CMP   Chest x-ray was performed this morning and outside facility and I can see the impression without acute disease    Interventions include:  Hydroxyzine    The patient has significant medical comorbidities that influence decision making for this acute process, such as:  Prediabetes    I decided to obtain the patient's medical records and review relevant documentation from hospital records.  Pertinent information is noted.  Reviewed prior emergency department visits, the patient has been seen multiple times for similar complaints, generally negative workups thought to be related to anxiety.    There are no labs from today, but he does have a chest x-ray performed at 12:52 p.m. with no acute cardiopulmonary process as the final impression.    He had a CTA calcium scoring in 2023 that was normal    He has a chart that is marked for merge under a different last name with multiple other visits for similar.    Medications   hydrOXYzine pamoate capsule 25 mg (25 mg Oral Given 6/10/24 1915)       Results and ED Course    Labs Reviewed   CBC W/ AUTO DIFFERENTIAL - Abnormal; Notable for the following components:       Result Value    Hemoglobin 13.4 (*)     Hematocrit 39.5 (*)      MCV 81 (*)     All other components within normal limits   COMPREHENSIVE METABOLIC PANEL   TROPONIN I   B-TYPE NATRIURETIC PEPTIDE       Imaging Results    None         ED Course as of 06/10/24 1927   Mon Buddy 10, 2024   1819 WBC: 6.74 [AC]   1819 Hemoglobin(!): 13.4 [AC]   1918 Troponin I: <0.006 [AC]   1918 BNP: <10 [AC]   1918 Comprehensive metabolic panel  unremarkable [AC]   1918 Patient asking to be discharged.  I think he was stable for discharge.  Given referrals as discuss with him previously. [AC]      ED Course User Index  [AC] Erick Argueta,                EKG interpreted by myself    EKG  Performed: 06/10/2024   Rate/Rhythm/Axis: 63 bpm, sinus rhythm, nml axis  QRS 94 ms  Qtc 388 ms  Impression:  Early repolarization noted, without significant ST segment elevation representative of occlusive MI, no other significant interval abnormality      Relevant imaging   X-Ray Chest PA And Lateral  Order: [id number]  Impression    No acute cardiopulmonary process.    Preliminary Report Dictated By: JULIETA ROONEY MD    Electronically Signed By: Arnold Pratt 6/10/2024 2:37 PM CDT  Narrative    CLINICAL HISTORY: 44 years old with ANXIETY SHORTNESS OF BREATH    COMPARISON: There are prior multiple exams available for direct comparison, most recently performed on February 9, 2023.    TECHNIQUE: PA and lateral radiographs of the chest with ClearReadTM  bone suppression algorithm.    FINDINGS:    SUPPORT LINES & TUBES: None.    LUNGS FIELDS: Lungs are symmetrically expanded and well aerated. No focal consolidation. No pleural effusion or pneumothorax.    HEART & MEDIASTINUM: Cardiomediastinal silhouette is normal in size and contour.      VINAY: Normal.    AIRWAYS: Trachea is midline.    BONES & JOINTS: Visualized osseous structures are unchanged.    SOFT TISSUES OF THORAX & UPPER ABDOMEN: No acute abnormality in the visualized upper abdomen.    Exam End: 06/10/24 12:52    Specimen Collected: 06/10/24 13:46         Impression and Plan    44 y.o. male with findings of now resolved heart skipping/palpitations and previous sore throat without any significant exam findings based on the work up in the emergency department as above.    Important lab/imaging findings include:  reviewed as above    All tests, treatment options and disposition options were discussed with the patient.  The decision was made to discharge the patient to home.    The patient was discharged in stable condition and all further questions/concerns by patient and/or family were addressed.    The patient will follow up with their primary care physician and specialist as discussed in the next several days or return if any further concerns or change in symptoms necessitating re-evaluation.    We discussed seeing Psychiatry given his association with the symptoms with anxiety and severe stress.  Doubt this is cardiac in nature given prior negative consultations in the past according to the patient, but he was notified that if he was persistent symptoms he can certainly follow back up with a cardiologist that has previously evaluated him.  Otherwise, return to the emergency department as needed.           Final diagnoses:  [R06.02] Shortness of breath  [R00.2] Palpitations  [F41.9] Anxiety (Primary)        ED Disposition Condition    Discharge Stable          ED Prescriptions    None       Follow-up Information       Follow up With Specialties Details Why Contact Info Additional Information    Ben Gonzalez - Psych [address] Psychiatry Schedule an appointment as soon as possible for a visit in 1 week  [id number]  [facility] [address] [phone] [facility] 4th Floor, [address] [facility] park in [facility] and use [facility] Medical [facility] elevator    Ben Gonzalez - Cardiology - [facility] Cardiology Schedule an appointment as soon as possible for a visit in 1 week As needed [id number]  [facility] [id number] [phone]  Cardiology Services Clinics - 3rd floor               Erick Argueta,   06/10/24 1929

## 2024-06-10 NOTE — ED NOTES
Patient identifiers verified and correct for Agus Palacios  LOC: The patient is awake, alert and aware of environment with an appropriate affect, the patient is oriented x 3 and speaking appropriately.   APPEARANCE: Patient appears comfortable and in no acute distress, patient is clean and well groomed.  SKIN: The skin is warm and dry, color consistent with ethnicity, patient has normal skin turgor and moist mucus membranes, skin intact, no breakdown or bruising noted.   MUSCULOSKELETAL: Patient moving all extremities spontaneously, no swelling noted.  RESPIRATORY: Airway is open and patent, respirations are spontaneous, patient has a normal effort and rate, no accessory muscle use noted, O2 Sat 97% on room air.  CARDIAC: Patient has a normal rate and regular rhythm, no edema noted, capillary refill < 3 seconds.   GASTRO: Soft and non tender to palpation, no distention noted, Pt states bowel movements have been regular.  : Pt denies any pain or frequency with urination.  NEURO: Pt opens eyes spontaneously, behavior appropriate to situation, follows commands, facial expression symmetrical, bilateral hand grasp equal and even, purposeful motor response noted, normal sensation in all extremities when touched with a finger.

## 2024-06-10 NOTE — Clinical Note
"Agus Rodriguezmarjan Palacios was seen and treated in our emergency department on 6/10/2024.  He may return to work on 06/12/2024.  Can return without restriction     If you have any questions or concerns, please don't hesitate to call.      Erick Argueta, DO"

## 2024-06-10 NOTE — PROVIDER PROGRESS NOTES - EMERGENCY DEPT.
Encounter Date: 6/10/2024    ED Physician Progress Notes         EKG - STEMI Decision  Initial Reading: No STEMI present.  Response: No Action Needed.

## 2024-06-11 LAB
OHS QRS DURATION: 88 MS
OHS QRS DURATION: 94 MS
OHS QTC CALCULATION: 382 MS
OHS QTC CALCULATION: 388 MS

## 2024-07-01 ENCOUNTER — HOSPITAL ENCOUNTER (EMERGENCY)
Facility: HOSPITAL | Age: 44
Discharge: HOME | End: 2024-07-01
Attending: EMERGENCY MEDICINE

## 2024-07-01 VITALS
TEMPERATURE: 98 F | OXYGEN SATURATION: 100 % | DIASTOLIC BLOOD PRESSURE: 64 MMHG | SYSTOLIC BLOOD PRESSURE: 111 MMHG | HEART RATE: 61 BPM | RESPIRATION RATE: 14 BRPM

## 2024-07-01 DIAGNOSIS — R00.2 PALPITATIONS: Primary | ICD-10-CM

## 2024-07-01 LAB
ALBUMIN SERPL BCP-MCNC: 4.1 G/DL (ref 3.5–5.2)
ALP SERPL-CCNC: 53 U/L (ref 55–135)
ALT SERPL W/O P-5'-P-CCNC: 44 U/L (ref 10–44)
ANION GAP SERPL CALC-SCNC: 11 MMOL/L (ref 8–16)
AST SERPL-CCNC: 55 U/L (ref 10–40)
BASOPHILS # BLD AUTO: 0.06 K/UL (ref 0–0.2)
BASOPHILS NFR BLD: 1 % (ref 0–1.9)
BILIRUB SERPL-MCNC: 0.4 MG/DL (ref 0.1–1)
BNP SERPL-MCNC: <10 PG/ML (ref 0–99)
BUN SERPL-MCNC: 21 MG/DL (ref 6–20)
CALCIUM SERPL-MCNC: 9.7 MG/DL (ref 8.7–10.5)
CHLORIDE SERPL-SCNC: 104 MMOL/L (ref 95–110)
CO2 SERPL-SCNC: 22 MMOL/L (ref 23–29)
CREAT SERPL-MCNC: 1.3 MG/DL (ref 0.5–1.4)
DIFFERENTIAL METHOD BLD: ABNORMAL
EOSINOPHIL # BLD AUTO: 0.1 K/UL (ref 0–0.5)
EOSINOPHIL NFR BLD: 1.7 % (ref 0–8)
ERYTHROCYTE [DISTWIDTH] IN BLOOD BY AUTOMATED COUNT: 14 % (ref 11.5–14.5)
EST. GFR  (NO RACE VARIABLE): >60 ML/MIN/1.73 M^2
GLUCOSE SERPL-MCNC: 97 MG/DL (ref 70–110)
HCT VFR BLD AUTO: 39.8 % (ref 40–54)
HGB BLD-MCNC: 13.2 G/DL (ref 14–18)
IMM GRANULOCYTES # BLD AUTO: 0.03 K/UL (ref 0–0.04)
IMM GRANULOCYTES NFR BLD AUTO: 0.5 % (ref 0–0.5)
LYMPHOCYTES # BLD AUTO: 1.5 K/UL (ref 1–4.8)
LYMPHOCYTES NFR BLD: 24.2 % (ref 18–48)
MAGNESIUM SERPL-MCNC: 2 MG/DL (ref 1.6–2.6)
MCH RBC QN AUTO: 27.6 PG (ref 27–31)
MCHC RBC AUTO-ENTMCNC: 33.2 G/DL (ref 32–36)
MCV RBC AUTO: 83 FL (ref 82–98)
MONOCYTES # BLD AUTO: 0.5 K/UL (ref 0.3–1)
MONOCYTES NFR BLD: 8.2 % (ref 4–15)
NEUTROPHILS # BLD AUTO: 3.9 K/UL (ref 1.8–7.7)
NEUTROPHILS NFR BLD: 64.4 % (ref 38–73)
NRBC BLD-RTO: 0 /100 WBC
OHS QRS DURATION: 92 MS
OHS QTC CALCULATION: 394 MS
PLATELET # BLD AUTO: 182 K/UL (ref 150–450)
PMV BLD AUTO: 11.4 FL (ref 9.2–12.9)
POTASSIUM SERPL-SCNC: 4.3 MMOL/L (ref 3.5–5.1)
PROT SERPL-MCNC: 7.7 G/DL (ref 6–8.4)
RBC # BLD AUTO: 4.78 M/UL (ref 4.6–6.2)
SODIUM SERPL-SCNC: 137 MMOL/L (ref 136–145)
TROPONIN I SERPL DL<=0.01 NG/ML-MCNC: <0.006 NG/ML (ref 0–0.03)
WBC # BLD AUTO: 6 K/UL (ref 3.9–12.7)

## 2024-07-01 PROCEDURE — 96374 THER/PROPH/DIAG INJ IV PUSH: CPT

## 2024-07-01 PROCEDURE — 93005 ELECTROCARDIOGRAM TRACING: CPT

## 2024-07-01 PROCEDURE — 93010 ELECTROCARDIOGRAM REPORT: CPT | Mod: ,,, | Performed by: INTERNAL MEDICINE

## 2024-07-01 PROCEDURE — 99284 EMERGENCY DEPT VISIT MOD MDM: CPT | Mod: 25

## 2024-07-01 PROCEDURE — 80053 COMPREHEN METABOLIC PANEL: CPT

## 2024-07-01 PROCEDURE — 84484 ASSAY OF TROPONIN QUANT: CPT

## 2024-07-01 PROCEDURE — 96361 HYDRATE IV INFUSION ADD-ON: CPT

## 2024-07-01 PROCEDURE — 63600175 PHARM REV CODE 636 W HCPCS

## 2024-07-01 PROCEDURE — 85025 COMPLETE CBC W/AUTO DIFF WBC: CPT

## 2024-07-01 PROCEDURE — 83880 ASSAY OF NATRIURETIC PEPTIDE: CPT

## 2024-07-01 PROCEDURE — 83735 ASSAY OF MAGNESIUM: CPT

## 2024-07-01 RX ORDER — KETOROLAC TROMETHAMINE 30 MG/ML
10 INJECTION, SOLUTION INTRAMUSCULAR; INTRAVENOUS
Status: COMPLETED | OUTPATIENT
Start: 2024-07-01 | End: 2024-07-01

## 2024-07-01 RX ADMIN — SODIUM CHLORIDE, POTASSIUM CHLORIDE, SODIUM LACTATE AND CALCIUM CHLORIDE 1000 ML: 600; 310; 30; 20 INJECTION, SOLUTION INTRAVENOUS at 02:07

## 2024-07-01 RX ADMIN — KETOROLAC TROMETHAMINE 10 MG: 30 INJECTION, SOLUTION INTRAMUSCULAR; INTRAVENOUS at 02:07

## 2024-07-01 NOTE — ED TRIAGE NOTES
Agus Palacios, a 44 y.o. male presents to the ED w/ complaint of     Triage note:  Chief Complaint   Patient presents with    Dizziness     Pt complaining of headache, dizziness and feeling like his heart is racing r98uype ago     Review of patient's allergies indicates:  No Known Allergies  No past medical history on file.

## 2024-07-01 NOTE — DISCHARGE INSTRUCTIONS
Please follow up with your primary care doctor as well as therapist for continued care regarding current symptoms and regular routine screening.

## 2024-07-01 NOTE — ED PROVIDER NOTES
Encounter Date: 7/1/2024       History     Chief Complaint   Patient presents with    Dizziness     Pt complaining of headache, dizziness and feeling like his heart is racing d27uxyo ago     Patient was a 44-year-old male with no past medical history that presents to emergency department with palpitations and headache.  States that he was sleeping when he began to have palpitations in his chest followed by pressure and headache in his head.  States that happened off and on over the past couple of months.  Came to the emergency department for this earlier this month.  Denies any fever, denies chest pain, denies abdominal pain, denies change in vision.  States that he drove here without any issues.  Admits that he was supposed to see cardiologist and a therapist after his visit in the ER last time.  He was not been able to schedule these appointments.    The history is provided by the patient and medical records.     Review of patient's allergies indicates:  No Known Allergies  No past medical history on file.  No past surgical history on file.  No family history on file.     Review of Systems  ROS negative except as noted in HPI     Physical Exam     Initial Vitals [07/01/24 0115]   BP Pulse Resp Temp SpO2   (!) 145/72 72 18 98.3 °F (36.8 °C) 100 %      MAP       --         Physical Exam    Nursing note and vitals reviewed.  Constitutional: He appears well-developed and well-nourished. No distress.   HENT:   Head: Normocephalic and atraumatic.   Right Ear: External ear normal.   Left Ear: External ear normal.   Nose: Nose normal.   Mouth/Throat: Oropharynx is clear and moist.   Eyes: Conjunctivae are normal.   Neck: No JVD present.   Cardiovascular:  Normal rate, regular rhythm, normal heart sounds and intact distal pulses.           Pulmonary/Chest: Breath sounds normal.   Abdominal: Abdomen is soft. He exhibits no distension.   Musculoskeletal:         General: No tenderness or edema. Normal range of motion.      Neurological: He is alert and oriented to person, place, and time. He has normal strength. No cranial nerve deficit or sensory deficit. GCS score is 15. GCS eye subscore is 4. GCS verbal subscore is 5. GCS motor subscore is 6.   Skin: Skin is warm. Capillary refill takes less than 2 seconds.         ED Course   Procedures  Labs Reviewed   CBC W/ AUTO DIFFERENTIAL - Abnormal; Notable for the following components:       Result Value    Hemoglobin 13.2 (*)     Hematocrit 39.8 (*)     All other components within normal limits   COMPREHENSIVE METABOLIC PANEL - Abnormal; Notable for the following components:    CO2 22 (*)     BUN 21 (*)     Alkaline Phosphatase 53 (*)     AST 55 (*)     All other components within normal limits   MAGNESIUM   TROPONIN I   B-TYPE NATRIURETIC PEPTIDE          Imaging Results    None          Medications   lactated ringers bolus 1,000 mL (0 mLs Intravenous Stopped 7/1/24 033)   ketorolac injection 9.999 mg (9.999 mg Intravenous Given 7/1/24 6029)     Medical Decision Making  Patient was a 44-year-old male with no past medical history that presents to emergency department with palpitations and headache.    On initial evaluation patient was in no acute distress.  Cooperative and communicative with history and physical examination    Differential includes:  Headache, ACS, pneumonia, viral illness.  History is not consistent with ACS.  Patient was sudden onset symptoms while sleeping.  He denies any exertional symptoms.  Denies any chest pain.  He had palpitations prior to onset of headache.  After discussion about possible symptom management patient states that he needs to be able to go to work tomorrow.  We will not provide him with any sedative medications.  Provided patient with IV Toradol with improvement of his headache.  Cardiac workup negative for any elevated troponin or BNP.  Heart score 1, low risk for ACS.  We will discharge with appropriate ER return precautions.  Instructed  patient to follow up with PCP regarding headache and other chronic medical history.     Amount and/or Complexity of Data Reviewed  Labs: ordered. Decision-making details documented in ED Course.  ECG/medicine tests:  Decision-making details documented in ED Course.    Risk  Prescription drug management.               ED Course as of 07/01/24 0441 Mon Jul 01, 2024 0229 EKG 12-lead  Independent interpretation:  Normal sinus rhythm, no elevations or depressions [TK]   0301 Hemoglobin(!): 13.2  Stable [TK]   0335 Troponin I: <0.006 [TK]   0335 BNP: <10 [TK]   0335 AST(!): 55 [TK]   0335 ALP(!): 53 [TK]      ED Course User Index  [TK] Albin Rossi DO                           Clinical Impression:  Final diagnoses:  [R00.2] Palpitations (Primary)          ED Disposition Condition    Discharge Stable          ED Prescriptions    None       Follow-up Information       Follow up With Specialties Details Why Contact Info    Ben marilee - Emergency Dept Emergency Medicine  If symptoms worsen [address] [facility] [address] [phone]             lAbin Rossi DO  Resident  07/01/24 7891

## 2024-11-02 ENCOUNTER — HOSPITAL ENCOUNTER (EMERGENCY)
Facility: HOSPITAL | Age: 44
Discharge: HOME | End: 2024-11-02
Attending: EMERGENCY MEDICINE

## 2024-11-02 VITALS
BODY MASS INDEX: 24.34 KG/M2 | OXYGEN SATURATION: 98 % | HEIGHT: 70 IN | DIASTOLIC BLOOD PRESSURE: 69 MMHG | HEART RATE: 87 BPM | WEIGHT: 170 LBS | RESPIRATION RATE: 20 BRPM | TEMPERATURE: 98 F | SYSTOLIC BLOOD PRESSURE: 121 MMHG

## 2024-11-02 DIAGNOSIS — S01.01XA SCALP LACERATION, INITIAL ENCOUNTER: ICD-10-CM

## 2024-11-02 DIAGNOSIS — S09.90XA INJURY OF HEAD, INITIAL ENCOUNTER: Primary | ICD-10-CM

## 2024-11-02 PROCEDURE — 99282 EMERGENCY DEPT VISIT SF MDM: CPT | Mod: 25

## 2024-11-02 PROCEDURE — 12001 RPR S/N/AX/GEN/TRNK 2.5CM/<: CPT

## 2024-11-02 PROCEDURE — 63600175 PHARM REV CODE 636 W HCPCS: Performed by: PHYSICIAN ASSISTANT

## 2024-11-02 RX ORDER — LIDOCAINE HYDROCHLORIDE 10 MG/ML
10 INJECTION, SOLUTION INFILTRATION; PERINEURAL
Status: COMPLETED | OUTPATIENT
Start: 2024-11-02 | End: 2024-11-02

## 2024-11-02 RX ADMIN — LIDOCAINE HYDROCHLORIDE 10 ML: 10 INJECTION, SOLUTION INFILTRATION; PERINEURAL at 04:11
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
## 2024-11-02 NOTE — DISCHARGE INSTRUCTIONS
You can wash the wound gently with soap and water or shampoo.  You may apply antibiotic ointment, but do not use for more than 2-3 days. Follow up with your primary care provider, any urgent care, or return to the ER in 10 days for staple removal and reevaluation of the wound. Return to the ER immediately for any signs of infection including swelling, redness, warmth, or pus draining from the wound; severe headache, confusion, vomiting, difficulty moving your arms or legs, fevers >100.4, or for any new or concerning symptoms.

## 2025-01-04 ENCOUNTER — HOSPITAL ENCOUNTER (EMERGENCY)
Facility: HOSPITAL | Age: 45
Discharge: HOME | End: 2025-01-04
Attending: EMERGENCY MEDICINE

## 2025-01-04 VITALS
DIASTOLIC BLOOD PRESSURE: 69 MMHG | RESPIRATION RATE: 17 BRPM | SYSTOLIC BLOOD PRESSURE: 115 MMHG | HEIGHT: 70 IN | HEART RATE: 63 BPM | TEMPERATURE: 98 F | OXYGEN SATURATION: 98 % | WEIGHT: 170 LBS | BODY MASS INDEX: 24.34 KG/M2

## 2025-01-04 DIAGNOSIS — R07.9 CHEST PAIN: ICD-10-CM

## 2025-01-04 LAB
ALBUMIN SERPL BCP-MCNC: 4.3 G/DL (ref 3.5–5.2)
ALP SERPL-CCNC: 47 U/L (ref 40–150)
ALT SERPL W/O P-5'-P-CCNC: 27 U/L (ref 10–44)
ANION GAP SERPL CALC-SCNC: 11 MMOL/L (ref 8–16)
AST SERPL-CCNC: 31 U/L (ref 10–40)
BASOPHILS # BLD AUTO: 0.03 K/UL (ref 0–0.2)
BASOPHILS NFR BLD: 0.6 % (ref 0–1.9)
BILIRUB SERPL-MCNC: 0.5 MG/DL (ref 0.1–1)
BNP SERPL-MCNC: 15 PG/ML (ref 0–99)
BUN SERPL-MCNC: 20 MG/DL (ref 6–20)
CALCIUM SERPL-MCNC: 9.2 MG/DL (ref 8.7–10.5)
CHLORIDE SERPL-SCNC: 104 MMOL/L (ref 95–110)
CO2 SERPL-SCNC: 24 MMOL/L (ref 23–29)
CREAT SERPL-MCNC: 1.4 MG/DL (ref 0.5–1.4)
DIFFERENTIAL METHOD BLD: ABNORMAL
EOSINOPHIL # BLD AUTO: 0.1 K/UL (ref 0–0.5)
EOSINOPHIL NFR BLD: 1.9 % (ref 0–8)
ERYTHROCYTE [DISTWIDTH] IN BLOOD BY AUTOMATED COUNT: 14.3 % (ref 11.5–14.5)
EST. GFR  (NO RACE VARIABLE): >60 ML/MIN/1.73 M^2
GLUCOSE SERPL-MCNC: 118 MG/DL (ref 70–110)
HCT VFR BLD AUTO: 38.7 % (ref 40–54)
HGB BLD-MCNC: 12.7 G/DL (ref 14–18)
IMM GRANULOCYTES # BLD AUTO: 0.02 K/UL (ref 0–0.04)
IMM GRANULOCYTES NFR BLD AUTO: 0.4 % (ref 0–0.5)
LYMPHOCYTES # BLD AUTO: 1.7 K/UL (ref 1–4.8)
LYMPHOCYTES NFR BLD: 33.3 % (ref 18–48)
MCH RBC QN AUTO: 26.9 PG (ref 27–31)
MCHC RBC AUTO-ENTMCNC: 32.8 G/DL (ref 32–36)
MCV RBC AUTO: 82 FL (ref 82–98)
MONOCYTES # BLD AUTO: 0.6 K/UL (ref 0.3–1)
MONOCYTES NFR BLD: 10.8 % (ref 4–15)
NEUTROPHILS # BLD AUTO: 2.7 K/UL (ref 1.8–7.7)
NEUTROPHILS NFR BLD: 53 % (ref 38–73)
NRBC BLD-RTO: 0 /100 WBC
OHS QRS DURATION: 88 MS
OHS QTC CALCULATION: 378 MS
PLATELET # BLD AUTO: 157 K/UL (ref 150–450)
PMV BLD AUTO: 10.8 FL (ref 9.2–12.9)
POTASSIUM SERPL-SCNC: 3.7 MMOL/L (ref 3.5–5.1)
PROT SERPL-MCNC: 8 G/DL (ref 6–8.4)
RBC # BLD AUTO: 4.72 M/UL (ref 4.6–6.2)
SODIUM SERPL-SCNC: 139 MMOL/L (ref 136–145)
TROPONIN I SERPL DL<=0.01 NG/ML-MCNC: <3 NG/L (ref 0–35)
WBC # BLD AUTO: 5.17 K/UL (ref 3.9–12.7)

## 2025-01-04 PROCEDURE — 93005 ELECTROCARDIOGRAM TRACING: CPT

## 2025-01-04 PROCEDURE — 99285 EMERGENCY DEPT VISIT HI MDM: CPT | Mod: 25

## 2025-01-04 PROCEDURE — 93010 ELECTROCARDIOGRAM REPORT: CPT | Mod: ,,, | Performed by: INTERNAL MEDICINE

## 2025-01-04 PROCEDURE — 63600175 PHARM REV CODE 636 W HCPCS

## 2025-01-04 PROCEDURE — 85025 COMPLETE CBC W/AUTO DIFF WBC: CPT

## 2025-01-04 PROCEDURE — 25000003 PHARM REV CODE 250

## 2025-01-04 PROCEDURE — 96374 THER/PROPH/DIAG INJ IV PUSH: CPT

## 2025-01-04 PROCEDURE — 80053 COMPREHEN METABOLIC PANEL: CPT

## 2025-01-04 PROCEDURE — 83880 ASSAY OF NATRIURETIC PEPTIDE: CPT

## 2025-01-04 PROCEDURE — 84484 ASSAY OF TROPONIN QUANT: CPT

## 2025-01-04 RX ORDER — KETOROLAC TROMETHAMINE 30 MG/ML
10 INJECTION, SOLUTION INTRAMUSCULAR; INTRAVENOUS
Status: COMPLETED | OUTPATIENT
Start: 2025-01-04 | End: 2025-01-04

## 2025-01-04 RX ORDER — ACETAMINOPHEN 500 MG
1000 TABLET ORAL
Status: COMPLETED | OUTPATIENT
Start: 2025-01-04 | End: 2025-01-04

## 2025-01-04 RX ORDER — METHOCARBAMOL 500 MG/1
500 TABLET, FILM COATED ORAL 3 TIMES DAILY
Qty: 30 TABLET | Refills: 0 | Status: SHIPPED | OUTPATIENT
Start: 2025-01-04 | End: 2025-01-14

## 2025-01-04 RX ORDER — HYDROXYZINE PAMOATE 25 MG/1
25 CAPSULE ORAL
Status: DISCONTINUED | OUTPATIENT
Start: 2025-01-04 | End: 2025-01-04

## 2025-01-04 RX ADMIN — ACETAMINOPHEN 1000 MG: 500 TABLET ORAL at 04:01

## 2025-01-04 RX ADMIN — KETOROLAC TROMETHAMINE 10 MG: 30 INJECTION, SOLUTION INTRAMUSCULAR; INTRAVENOUS at 02:01

## 2025-01-04 NOTE — ED PROVIDER NOTES
"Encounter Date: 1/4/2025       History     Chief Complaint   Patient presents with    Chest Pain    Shortness of Breath     Pt has c/o L sided chest pain radiating down back of L arm beginning 1 hr ago. Pt also endorses SOB, nausea and "feeling like I'm about to pass out. Denies cough.      The history is provided by the patient.     Patient is a 44-year-old male with no pertinent past medical history who presents due to generalized left-sided pleuritic chest pain.  He states that it has been intermittent for him over the course of the past few months, but that today it became more significant when he was taking deep breaths.  He notes that he has presented to the emergency department multiple times for this before and has had a negative stress test.  Notes that he had a Holter monitor that did not show any dysrhythmias.  Admits that he has a degree of anxiety and thinks that this may potentially be contributory towards symptoms.  Denies having any shortness of breath or fever.    Review of patient's allergies indicates:  No Known Allergies  History reviewed. No pertinent past medical history.  Past Surgical History:   Procedure Laterality Date    ANKLE SURGERY       No family history on file.  Social History     Tobacco Use    Smoking status: Never    Smokeless tobacco: Never     Physical Exam     Initial Vitals [01/04/25 0119]   BP Pulse Resp Temp SpO2   (!) 189/88 72 20 98.3 °F (36.8 °C) 100 %      MAP       --         Physical Exam    Nursing note and vitals reviewed.  Constitutional: He appears well-developed. No distress.   HENT:   Head: Normocephalic and atraumatic. Mouth/Throat: Oropharynx is clear and moist and mucous membranes are normal.   Eyes: EOM are normal. Pupils are equal, round, and reactive to light.   Neck:   Normal range of motion.  Cardiovascular:  Normal rate and regular rhythm.           No murmur heard.  Pulmonary/Chest: Breath sounds normal. No respiratory distress.   No tenderness to " palpation along the chest wall   Abdominal: Abdomen is soft. He exhibits no distension. There is no abdominal tenderness. There is no rebound and no guarding.   Musculoskeletal:         General: No edema.      Cervical back: Normal range of motion.     Neurological: He is alert and oriented to person, place, and time.   Skin: Skin is warm.   Psychiatric: He has a normal mood and affect. His behavior is normal.         ED Course   Procedures  Labs Reviewed   CBC W/ AUTO DIFFERENTIAL - Abnormal       Result Value    WBC 5.17      RBC 4.72      Hemoglobin 12.7 (*)     Hematocrit 38.7 (*)     MCV 82      MCH 26.9 (*)     MCHC 32.8      RDW 14.3      Platelets 157      MPV 10.8      Immature Granulocytes 0.4      Gran # (ANC) 2.7      Immature Grans (Abs) 0.02      Lymph # 1.7      Mono # 0.6      Eos # 0.1      Baso # 0.03      nRBC 0      Gran % 53.0      Lymph % 33.3      Mono % 10.8      Eosinophil % 1.9      Basophil % 0.6      Differential Method Automated     COMPREHENSIVE METABOLIC PANEL - Abnormal    Sodium 139      Potassium 3.7      Chloride 104      CO2 24      Glucose 118 (*)     BUN 20      Creatinine 1.4      Calcium 9.2      Total Protein 8.0      Albumin 4.3      Total Bilirubin 0.5      Alkaline Phosphatase 47      AST 31      ALT 27      eGFR >60.0      Anion Gap 11     TROPONIN I HIGH SENSITIVITY    Troponin I High Sensitivity <3     B-TYPE NATRIURETIC PEPTIDE    BNP 15       EKG Readings: (Independently Interpreted)   Independently interpreted by MD:  Rate 67, NSR, no stemi, no ectopy, mild LVH, MT/QT interval within normal limits         Imaging Results              X-Ray Chest AP Portable (Final result)  Result time 01/04/25 03:01:16      Final result by Sammy Bradford MD (01/04/25 03:01:16)                   Impression:      No acute cardiopulmonary finding identified on this single view.      Electronically signed by: Sammy Bradford MD  Date:    01/04/2025  Time:    03:01                "Narrative:    EXAMINATION:  XR CHEST AP PORTABLE    CLINICAL HISTORY:  Provided history is "  Chest pain, unspecified".    TECHNIQUE:  One view of the chest.    COMPARISON:  03/31/2024.    FINDINGS:  Cardiac silhouette is not enlarged.  No focal consolidation.  No sizable pleural effusion.  No pneumothorax.                                       Medications   ketorolac injection 9.999 mg (9.999 mg Intravenous Given 1/4/25 0233)   acetaminophen tablet 1,000 mg (1,000 mg Oral Given 1/4/25 0402)     Medical Decision Making  Patient is a 44-year-old male who presents due to left-sided pleuritic chest pain.  Differential diagnosis includes but is not limited to anxiety, costochondritis, lower clinical concern for ACS, pneumothorax, pulmonary embolism.  Noted to be mildly hypertensive but otherwise has reassuring vital signs and physical exam mostly unremarkable.  Will perform basic cardiac workup in the setting of his chest pain as well as give a dose of Toradol for symptomatic control.    The entirety of the laboratory workup was shown to be unremarkable.  Heart score found to be 1 and PERC found to be 0 so little concern for ACS and PE.  Upon re-evaluation, patient noted that he was still having some discomfort in the region of his left chest.  We will give a dose of Tylenol.    Will give a prescription for Robaxin to use at home and have him follow up with his PCP for further management.  Stable for discharge at this time.    Amount and/or Complexity of Data Reviewed  Labs: ordered.  Radiology: ordered.    Risk  OTC drugs.  Prescription drug management.      Additional MDM:   PERC Rule:   Age is greater than or equal to 50 = 0.0  Heart Rate is greater than or equal to 100 = 0.0  SaO2 on room air < 95% = 0.0  Unilateral leg swelling = 0.0  Hemoptysis = 0.0  Recent surgery or trauma = 0.0  Prior PE or DVT =  0.0  Hormone use = 0.00  PERC Score = 0      Heart Score:    History:          Slightly suspicious.  ECG:          "    Normal  Age:               Less than 45 years  Risk factors: 1-2 risk factors  Troponin:       Less than or equal to normal limit  Heart Score = 1                                 Clinical Impression:  Final diagnoses:  [R07.9] Chest pain          ED Disposition Condition    Discharge Stable          ED Prescriptions       Medication Sig Dispense Start Date End Date Auth. Provider    methocarbamoL (ROBAXIN) 500 MG Tab Take 1 tablet (500 mg total) by mouth 3 (three) times daily. for 10 days 30 tablet 1/4/2025 1/14/2025 Khang Martinez MD          Follow-up Information    None          Khang Martinez MD  Resident  01/04/25 0433

## 2025-01-04 NOTE — ED TRIAGE NOTES
"Agus Palacios, a 44 y.o. male presents to the ED w/ complaint of CP and SOB that x1 hr. Denies cardiac hx.     Triage note:  Chief Complaint   Patient presents with    Chest Pain    Shortness of Breath     Pt has c/o L sided chest pain radiating down back of L arm beginning 1 hr ago. Pt also endorses SOB, nausea and "feeling like I'm about to pass out. Denies cough.      Review of patient's allergies indicates:  No Known Allergies  No past medical history on file.    "

## 2025-01-31 ENCOUNTER — HOSPITAL ENCOUNTER (EMERGENCY)
Facility: HOSPITAL | Age: 45
Discharge: HOME | End: 2025-02-01
Attending: EMERGENCY MEDICINE

## 2025-01-31 DIAGNOSIS — H00.014 HORDEOLUM EXTERNUM OF LEFT UPPER EYELID: Primary | ICD-10-CM

## 2025-01-31 PROCEDURE — 99283 EMERGENCY DEPT VISIT LOW MDM: CPT

## 2025-01-31 PROCEDURE — 25000003 PHARM REV CODE 250: Performed by: PHYSICIAN ASSISTANT

## 2025-01-31 RX ORDER — PROPARACAINE HYDROCHLORIDE 5 MG/ML
1 SOLUTION/ DROPS OPHTHALMIC
Status: COMPLETED | OUTPATIENT
Start: 2025-01-31 | End: 2025-01-31

## 2025-01-31 RX ADMIN — PROPARACAINE HYDROCHLORIDE 1 DROP: 5 SOLUTION/ DROPS OPHTHALMIC at 11:01

## 2025-01-31 RX ADMIN — FLUORESCEIN SODIUM 1 EACH: 1 STRIP OPHTHALMIC at 11:01

## 2025-02-01 VITALS
HEART RATE: 62 BPM | RESPIRATION RATE: 16 BRPM | TEMPERATURE: 98 F | BODY MASS INDEX: 24.34 KG/M2 | WEIGHT: 170 LBS | OXYGEN SATURATION: 97 % | DIASTOLIC BLOOD PRESSURE: 70 MMHG | HEIGHT: 70 IN | SYSTOLIC BLOOD PRESSURE: 120 MMHG

## 2025-02-01 RX ORDER — ERYTHROMYCIN 5 MG/G
OINTMENT OPHTHALMIC
Qty: 3.5 G | Refills: 0 | Status: SHIPPED | OUTPATIENT
Start: 2025-01-31 | End: 2025-02-17 | Stop reason: ALTCHOICE

## 2025-02-01 NOTE — ED TRIAGE NOTES
Agus Palacios, a 45 y.o. male presents to the ED w/ complaint of left eye pain. Patient states having left eye pain and some swelling for the last 5 days, states having some blurred vision.     Triage note:  Chief Complaint   Patient presents with    Eye Pain     Poss stye on left eye with yellow d/c in am for 5 days; denies vision issues     Review of patient's allergies indicates:  No Known Allergies  History reviewed. No pertinent past medical history.

## 2025-02-01 NOTE — ED PROVIDER NOTES
Encounter Date: 1/31/2025       History     Chief Complaint   Patient presents with    Eye Pain     Poss stye on left eye with yellow d/c in am for 5 days; denies vision issues     45-year-old male with no pertinent PMHx presents to emergency department with left upper eyelid pain x5 days.  He has associated swelling to his left upper eyelid and eye pain.  He reports some yellow crusty discharge 1st thing in the morning though denies drainage during the day.  No eye redness or itching.  He does not wear contacts.  He denies injury.  He denies visual changes.  He denies fever    The history is provided by the patient.     Review of patient's allergies indicates:  No Known Allergies  History reviewed. No pertinent past medical history.  Past Surgical History:   Procedure Laterality Date    ANKLE SURGERY       No family history on file.  Social History     Tobacco Use    Smoking status: Never    Smokeless tobacco: Never     Review of Systems   Constitutional:  Negative for fever.   Eyes:  Positive for photophobia, pain and discharge. Negative for redness, itching and visual disturbance.       Physical Exam     Initial Vitals [01/31/25 2101]   BP Pulse Resp Temp SpO2   137/77 73 17 98 °F (36.7 °C) 97 %      MAP       --         Physical Exam    Nursing note and vitals reviewed.  Constitutional: He appears well-developed and well-nourished. He is not diaphoretic. No distress.   HENT:   Head: Normocephalic and atraumatic. Head is without left periorbital erythema.   Nose: Nose normal.   No periorbital swelling or erythema.   Eyes: Conjunctivae and EOM are normal. Pupils are equal, round, and reactive to light. Left eye exhibits no chemosis, no discharge and no exudate. No foreign body present in the left eye. Left conjunctiva is not injected. Left conjunctiva has no hemorrhage.   Localized swelling to the left upper eyelid suspicious for hordeolum. Pinguecula of left eye     Neck: Neck supple.   Cardiovascular:  Normal  rate.           Pulmonary/Chest: No respiratory distress.   Musculoskeletal:      Cervical back: Neck supple.     Neurological: He is alert and oriented to person, place, and time.   Skin: No rash noted.   Psychiatric: He has a normal mood and affect. Thought content normal.         ED Course   Procedures  Labs Reviewed   HEPATITIS C ANTIBODY   HIV 1 / 2 ANTIBODY          Imaging Results    None          Medications   fluorescein ophthalmic strip 1 each (has no administration in time range)   proparacaine 0.5 % ophthalmic solution 1 drop (has no administration in time range)     Medical Decision Making  45-year-old male with no pertinent PMHx presents to emergency department with left upper eyelid pain x5 days. Nontoxic appearing. Hemodynamically stable. Afebrile. Exam as above. I will initiate workup and reassess.      Ddx: stye, corneal abrasion, HSV keratitis, preseptal cellulitis, periorbital cellulitis    - Focal swelling of left upper eye lid, most likely a stye   - Eyelid flipped with small area of erythema. No FB  - Visual Assessment: Visual Acuity-Left: 20/50 and Visual Acuity-Right: 20/40. Patient denies visual changes   - no acute eye/corneal abnormalities on exam.  - No fluorescein uptake   - Normal pressure with tonopen   - Erythromycin ointment prescribed for stye. Recommended warm compresses. Ophthalmology referral placed for follow if symptoms do not improve   - Strict ED precautions given to return immediately for new, worsening, or concerning symptoms             Risk  Prescription drug management.              Attending Attestation:             Attending ED Notes:   The face-to-face encounter and management were performed solely by the NATALI.  I was immediately available for consultation, but was not involved in the care of the patient.                               Clinical Impression:  Final diagnoses:  [H00.014] Hordeolum externum of left upper eyelid (Primary)          ED Disposition Condition     Discharge Stable          ED Prescriptions    None       Follow-up Information       Follow up With Specialties Details Why Contact Info Additional Information    [facility] - Emergency Dept Emergency Medicine  If symptoms worsen [id number]  [facility] [id number] [phone]     [facility] - [address] [place] [address] Ophthalmology   [address]  [facility] [id number] [phone] Please arrive on the 10th floor for check-in.             Nancy Durand PA-C  02/01/25 1704       Dino Fernando MD  02/02/25 0337

## 2025-02-01 NOTE — DISCHARGE INSTRUCTIONS
I suspect your symptoms are secondary to a stye.  In his will be treated with an antibiotic ointment.  Use as directed    I Recommend warm compresses to your eyelid    You May take ibuprofen over-the-counter as needed pain    Follow up with Ophthalmology if your symptoms do not improve    Strict ED precautions given to return immediately for new, worsening, or concerning symptoms including but not limited to worsening of your symptoms or visual changes

## 2025-02-09 ENCOUNTER — HOSPITAL ENCOUNTER (EMERGENCY)
Facility: HOSPITAL | Age: 45
Discharge: HOME | End: 2025-02-10
Attending: STUDENT IN AN ORGANIZED HEALTH CARE EDUCATION/TRAINING PROGRAM

## 2025-02-09 DIAGNOSIS — R07.9 LEFT-SIDED CHEST PAIN: ICD-10-CM

## 2025-02-09 DIAGNOSIS — R00.2 PALPITATIONS: ICD-10-CM

## 2025-02-09 LAB
BASOPHILS # BLD AUTO: 0.04 K/UL (ref 0–0.2)
BASOPHILS NFR BLD: 0.8 % (ref 0–1.9)
DIFFERENTIAL METHOD BLD: ABNORMAL
EOSINOPHIL # BLD AUTO: 0.2 K/UL (ref 0–0.5)
EOSINOPHIL NFR BLD: 3.1 % (ref 0–8)
ERYTHROCYTE [DISTWIDTH] IN BLOOD BY AUTOMATED COUNT: 14.3 % (ref 11.5–14.5)
HCT VFR BLD AUTO: 37.5 % (ref 40–54)
HGB BLD-MCNC: 12.9 G/DL (ref 14–18)
IMM GRANULOCYTES # BLD AUTO: 0.02 K/UL (ref 0–0.04)
IMM GRANULOCYTES NFR BLD AUTO: 0.4 % (ref 0–0.5)
LYMPHOCYTES # BLD AUTO: 1.7 K/UL (ref 1–4.8)
LYMPHOCYTES NFR BLD: 32.2 % (ref 18–48)
MCH RBC QN AUTO: 27.9 PG (ref 27–31)
MCHC RBC AUTO-ENTMCNC: 34.4 G/DL (ref 32–36)
MCV RBC AUTO: 81 FL (ref 82–98)
MONOCYTES # BLD AUTO: 0.5 K/UL (ref 0.3–1)
MONOCYTES NFR BLD: 8.8 % (ref 4–15)
NEUTROPHILS # BLD AUTO: 2.8 K/UL (ref 1.8–7.7)
NEUTROPHILS NFR BLD: 54.7 % (ref 38–73)
NRBC BLD-RTO: 0 /100 WBC
PLATELET # BLD AUTO: 169 K/UL (ref 150–450)
PMV BLD AUTO: 10.6 FL (ref 9.2–12.9)
RBC # BLD AUTO: 4.63 M/UL (ref 4.6–6.2)
WBC # BLD AUTO: 5.13 K/UL (ref 3.9–12.7)

## 2025-02-09 PROCEDURE — 83880 ASSAY OF NATRIURETIC PEPTIDE: CPT | Performed by: STUDENT IN AN ORGANIZED HEALTH CARE EDUCATION/TRAINING PROGRAM

## 2025-02-09 PROCEDURE — 99285 EMERGENCY DEPT VISIT HI MDM: CPT | Mod: 25

## 2025-02-09 PROCEDURE — 84484 ASSAY OF TROPONIN QUANT: CPT | Performed by: STUDENT IN AN ORGANIZED HEALTH CARE EDUCATION/TRAINING PROGRAM

## 2025-02-09 PROCEDURE — 80053 COMPREHEN METABOLIC PANEL: CPT | Performed by: STUDENT IN AN ORGANIZED HEALTH CARE EDUCATION/TRAINING PROGRAM

## 2025-02-09 PROCEDURE — 85025 COMPLETE CBC W/AUTO DIFF WBC: CPT | Performed by: STUDENT IN AN ORGANIZED HEALTH CARE EDUCATION/TRAINING PROGRAM

## 2025-02-09 PROCEDURE — 93005 ELECTROCARDIOGRAM TRACING: CPT

## 2025-02-09 PROCEDURE — 87389 HIV-1 AG W/HIV-1&-2 AB AG IA: CPT | Performed by: PHYSICIAN ASSISTANT

## 2025-02-09 PROCEDURE — 86803 HEPATITIS C AB TEST: CPT | Performed by: PHYSICIAN ASSISTANT

## 2025-02-09 PROCEDURE — 93010 ELECTROCARDIOGRAM REPORT: CPT | Mod: ,,, | Performed by: INTERNAL MEDICINE

## 2025-02-09 PROCEDURE — 94761 N-INVAS EAR/PLS OXIMETRY MLT: CPT

## 2025-02-09 RX ORDER — KETOROLAC TROMETHAMINE 30 MG/ML
10 INJECTION, SOLUTION INTRAMUSCULAR; INTRAVENOUS
Status: COMPLETED | OUTPATIENT
Start: 2025-02-09 | End: 2025-02-10

## 2025-02-10 VITALS
HEART RATE: 85 BPM | SYSTOLIC BLOOD PRESSURE: 142 MMHG | OXYGEN SATURATION: 99 % | RESPIRATION RATE: 16 BRPM | DIASTOLIC BLOOD PRESSURE: 80 MMHG | HEIGHT: 70 IN | BODY MASS INDEX: 24.34 KG/M2 | TEMPERATURE: 98 F | WEIGHT: 170 LBS

## 2025-02-10 LAB
ALBUMIN SERPL BCP-MCNC: 4.3 G/DL (ref 3.5–5.2)
ALP SERPL-CCNC: 49 U/L (ref 40–150)
ALT SERPL W/O P-5'-P-CCNC: 18 U/L (ref 10–44)
ANION GAP SERPL CALC-SCNC: 10 MMOL/L (ref 8–16)
AST SERPL-CCNC: 20 U/L (ref 10–40)
BILIRUB SERPL-MCNC: 0.4 MG/DL (ref 0.1–1)
BNP SERPL-MCNC: <10 PG/ML (ref 0–99)
BUN SERPL-MCNC: 17 MG/DL (ref 6–20)
CALCIUM SERPL-MCNC: 8.9 MG/DL (ref 8.7–10.5)
CHLORIDE SERPL-SCNC: 106 MMOL/L (ref 95–110)
CO2 SERPL-SCNC: 23 MMOL/L (ref 23–29)
CREAT SERPL-MCNC: 1.1 MG/DL (ref 0.5–1.4)
EST. GFR  (NO RACE VARIABLE): >60 ML/MIN/1.73 M^2
GLUCOSE SERPL-MCNC: 98 MG/DL (ref 70–110)
HCV AB SERPL QL IA: NORMAL
HIV 1+2 AB+HIV1 P24 AG SERPL QL IA: NORMAL
OHS QRS DURATION: 96 MS
OHS QTC CALCULATION: 374 MS
POTASSIUM SERPL-SCNC: 4.1 MMOL/L (ref 3.5–5.1)
PROT SERPL-MCNC: 7.9 G/DL (ref 6–8.4)
SODIUM SERPL-SCNC: 139 MMOL/L (ref 136–145)
TROPONIN I SERPL DL<=0.01 NG/ML-MCNC: <3 NG/L (ref 0–35)

## 2025-02-10 PROCEDURE — 63600175 PHARM REV CODE 636 W HCPCS: Mod: JZ,TB | Performed by: STUDENT IN AN ORGANIZED HEALTH CARE EDUCATION/TRAINING PROGRAM

## 2025-02-10 PROCEDURE — 96374 THER/PROPH/DIAG INJ IV PUSH: CPT

## 2025-02-10 RX ORDER — LIDOCAINE 50 MG/G
1 PATCH TOPICAL DAILY
Qty: 15 PATCH | Refills: 0 | Status: SHIPPED | OUTPATIENT
Start: 2025-02-10

## 2025-02-10 RX ORDER — NAPROXEN 500 MG/1
500 TABLET ORAL 2 TIMES DAILY WITH MEALS
Qty: 60 TABLET | Refills: 0 | Status: SHIPPED | OUTPATIENT
Start: 2025-02-10

## 2025-02-10 RX ADMIN — KETOROLAC TROMETHAMINE 10 MG: 30 INJECTION, SOLUTION INTRAMUSCULAR; INTRAVENOUS at 12:02

## 2025-02-10 NOTE — DISCHARGE INSTRUCTIONS
You were seen in the Emergency Department today for left-sided chest pain. We did x-rays, and EKG, and blood work all of which was reassuring and did not identify an emergent cause of your symptoms.     Please follow up with your primary doctor in the next 1 week for a repeat evaluation and further management.  If you do not have a primary doctor I have provided contact information for the clinic above to schedule an appointment with one.    You may try taking the naproxen 500 mg up to twice per day as needed for pain in addition to Tylenol 1000 mg every 6 hours, and they topical lidocaine patches applied to the area of most pain.    Please return to the Emergency Department immediately for any continued or worsening symptoms such as severe pain, trouble breathing, feeling dizzy, lightheaded, or if you pass out..

## 2025-02-14 NOTE — ED PROVIDER NOTES
Encounter Date: 2/9/2025       History     Chief Complaint   Patient presents with    Shortness of Breath     SOB and hear palpitations x1 hr while watching TV. Denies significant medical hx.     45-year-old male with no significant past medical history who presents to the emergency department for shortness of breath, palpitations, and left sided flank/chest pain.  He denies any exacerbating or alleviating factors.  No recent congestion, fevers, chills, leg swelling.  Has had a 2 week history of a nonproductive cough.  He was seen for similar symptoms about a month ago had a negative cardiac workup was risk stratified to low risk and was able to be discharged with improvement in symptoms.        Review of patient's allergies indicates:  No Known Allergies  History reviewed. No pertinent past medical history.  Past Surgical History:   Procedure Laterality Date    ANKLE SURGERY       No family history on file.  Social History     Tobacco Use    Smoking status: Never    Smokeless tobacco: Never     Review of Systems   Constitutional:  Negative for fever.   HENT:  Negative for sore throat.    Respiratory:  Positive for shortness of breath.    Cardiovascular:  Positive for chest pain.   Gastrointestinal:  Negative for nausea.   Genitourinary:  Negative for dysuria.   Musculoskeletal:  Negative for back pain.   Skin:  Negative for rash.   Neurological:  Negative for weakness.   Hematological:  Does not bruise/bleed easily.       Physical Exam     Initial Vitals [02/09/25 2250]   BP Pulse Resp Temp SpO2   (!) 152/71 97 19 97.9 °F (36.6 °C) 100 %      MAP       --         Physical Exam    Nursing note and vitals reviewed.  Constitutional: He appears well-developed and well-nourished.   HENT:   Head: Normocephalic and atraumatic.   Eyes: EOM are normal. Pupils are equal, round, and reactive to light.   Neck: Neck supple.   Normal range of motion.  Cardiovascular:  Normal rate and regular rhythm.           Pulmonary/Chest:  Breath sounds normal. No respiratory distress.   Left lateral chest wall tender to palpation without obvious bony deformity.   Abdominal: Abdomen is soft. He exhibits no distension. There is no abdominal tenderness. There is no rebound.   Musculoskeletal:         General: No tenderness or edema. Normal range of motion.      Cervical back: Normal range of motion and neck supple.     Neurological: He is alert and oriented to person, place, and time. No cranial nerve deficit.   Skin: Skin is warm and dry.         ED Course   Procedures  Labs Reviewed   CBC W/ AUTO DIFFERENTIAL - Abnormal       Result Value    WBC 5.13      RBC 4.63      Hemoglobin 12.9 (*)     Hematocrit 37.5 (*)     MCV 81 (*)     MCH 27.9      MCHC 34.4      RDW 14.3      Platelets 169      MPV 10.6      Immature Granulocytes 0.4      Gran # (ANC) 2.8      Immature Grans (Abs) 0.02      Lymph # 1.7      Mono # 0.5      Eos # 0.2      Baso # 0.04      nRBC 0      Gran % 54.7      Lymph % 32.2      Mono % 8.8      Eosinophil % 3.1      Basophil % 0.8      Differential Method Automated     HEPATITIS C ANTIBODY    Hepatitis C Ab Non-reactive      Narrative:     Release to patient->Immediate   HIV 1 / 2 ANTIBODY    HIV 1/2 Ag/Ab Non-reactive      Narrative:     Release to patient->Immediate   COMPREHENSIVE METABOLIC PANEL    Sodium 139      Potassium 4.1      Chloride 106      CO2 23      Glucose 98      BUN 17      Creatinine 1.1      Calcium 8.9      Total Protein 7.9      Albumin 4.3      Total Bilirubin 0.4      Alkaline Phosphatase 49      AST 20      ALT 18      eGFR >60.0      Anion Gap 10     TROPONIN I HIGH SENSITIVITY    Troponin I High Sensitivity <3     B-TYPE NATRIURETIC PEPTIDE    BNP <10          ECG Results              EKG 12-lead (Final result)        Collection Time Result Time QRS Duration OHS QTC Calculation    02/09/25 22:56:40 02/10/25 11:34:03 96 374                     Final result by Interface, Lab In [facility] (02/10/25 11:34:12)                    Narrative:    Test Reason : R00.2,    Vent. Rate :  60 BPM     Atrial Rate :  60 BPM     P-R Int : 136 ms          QRS Dur :  96 ms      QT Int : 374 ms       P-R-T Axes :  65  71  12 degrees    QTcB Int : 374 ms    Normal sinus rhythm  Normal ECG  When compared with ECG of 04-Jan-2025 01:20,  Nonspecific T wave abnormality now evident in Lateral leads  Confirmed by Claudy Hernandez ([id number]) on 2/10/2025 11:34:02 AM    Referred By: AAAREFERRAL SELF           Confirmed By: Claudy Mackey                                  Imaging Results              X-Ray Chest PA And Lateral (Final result)  Result time 02/10/25 00:15:13      Final result by Abi Valle MD (02/10/25 00:15:13)                   Impression:      No radiographic evidence of acute intra-thoracic process.      Electronically signed by: Abi Valle MD  Date:    02/10/2025  Time:    00:15               Narrative:    EXAMINATION:  XR CHEST PA AND LATERAL    CLINICAL HISTORY:  Chest pain, unspecified    TECHNIQUE:  PA and lateral views of the chest were performed.    COMPARISON:  01/04/2025    FINDINGS:  The cardiomediastinal silhouette is within normal limits. Mediastinal structures are midline.  The lungs appear symmetrically aerated without definite focal alveolar consolidation. No large pleural effusion or pneumothorax is appreciated.Visualized osseous structures are intact.                                       Medications   ketorolac injection 9.999 mg (9.999 mg Intravenous Given 2/10/25 0056)     Medical Decision Making  45-year-old male with no significant past medical history who presents to the emergency department for shortness of breath, palpitations, and left sided flank/chest pain.    Initial vital signs: stable, afebrile, with normal blood pressure    Initial physical exam: Left lateral chest wall tenderness. Remainder reassuring.    Differential Diagnosis: spontaneous pneumothorax, musculoskeletal chest pain, pneumonia.  Feel PE and ACS unlikely in this patient with a normal EKG    Plan: Chest pain workup, toradol.    I have reviewed and independently interpreted all available laboratory and imaging studies.    CXR without pneumothorax, pneumonia. EKG no ischemic changes. Trop undetectable. HEART score 0, low risk per PERC. Feel stable for continued treatment at home with NSAIDs, tylenol, and outpatient follow up. Patient agreeable with this plan. Strict return precautions were discussed, patient verbalized understanding and agreed with plan.  Patient discharged home.    Amount and/or Complexity of Data Reviewed  Labs: ordered.  Radiology: ordered.    Risk  Prescription drug management.               ED Course as of 02/14/25 1702   Sun Feb 09, 2025   2315 Left sided chest pain, worse with deep inspiration. Cough x 2 weeks. [MB]   2316 EKG at 2256m shows NSR rate 60. Normal axis, normal intervals, no acute ST or T wave abnormalities. [MB]      ED Course User Index  [MB] Ladarius Razo MD                           Clinical Impression:  Final diagnoses:  [R00.2] Palpitations  [R07.9] Left-sided chest pain          ED Disposition Condition    Discharge Stable          ED Prescriptions       Medication Sig Dispense Start Date End Date Auth. Provider    naproxen (NAPROSYN) 500 MG tablet Take 1 tablet (500 mg total) by mouth 2 (two) times daily with meals. 60 tablet 2/10/2025 -- Ladarius Razo MD    LIDOcaine (LIDODERM) 5 % Place 1 patch onto the skin once daily. Remove & Discard patch within 12 hours or as directed by MD 15 patch 2/10/2025 -- Ladarius Razo MD          Follow-up Information       Follow up With Specialties Details Why Contact Info    [facility]  Call in 1 day For a follow up appointment, As soon as possible Call [phone] to set up a follow up appointment with a primary doctor if you do not have one.             Ladarius Razo MD  02/14/25 [id number]

## 2025-02-17 ENCOUNTER — HOSPITAL ENCOUNTER (EMERGENCY)
Facility: HOSPITAL | Age: 45
Discharge: HOME | End: 2025-02-17
Attending: STUDENT IN AN ORGANIZED HEALTH CARE EDUCATION/TRAINING PROGRAM

## 2025-02-17 VITALS
TEMPERATURE: 98 F | OXYGEN SATURATION: 100 % | RESPIRATION RATE: 18 BRPM | SYSTOLIC BLOOD PRESSURE: 144 MMHG | BODY MASS INDEX: 24.34 KG/M2 | HEIGHT: 70 IN | HEART RATE: 76 BPM | WEIGHT: 170 LBS | DIASTOLIC BLOOD PRESSURE: 88 MMHG

## 2025-02-17 DIAGNOSIS — H00.014 HORDEOLUM EXTERNUM OF LEFT UPPER EYELID: Primary | ICD-10-CM

## 2025-02-17 PROCEDURE — 99283 EMERGENCY DEPT VISIT LOW MDM: CPT

## 2025-02-17 PROCEDURE — 25000003 PHARM REV CODE 250

## 2025-02-17 RX ORDER — OFLOXACIN 3 MG/ML
1 SOLUTION/ DROPS OPHTHALMIC 4 TIMES DAILY
Qty: 5 ML | Refills: 0 | Status: SHIPPED | OUTPATIENT
Start: 2025-02-17

## 2025-02-17 RX ORDER — PROPARACAINE HYDROCHLORIDE 5 MG/ML
1 SOLUTION/ DROPS OPHTHALMIC
Status: COMPLETED | OUTPATIENT
Start: 2025-02-17 | End: 2025-02-17

## 2025-02-17 RX ADMIN — PROPARACAINE HYDROCHLORIDE 1 DROP: 5 SOLUTION/ DROPS OPHTHALMIC at 05:02

## 2025-02-17 RX ADMIN — FLUORESCEIN SODIUM 1 EACH: 1 STRIP OPHTHALMIC at 05:02

## 2025-02-17 NOTE — FIRST PROVIDER EVALUATION
Medical screening examination initiated.  I have conducted a focused provider triage encounter, findings are as follows:    Brief history of present illness:  left eye pain, swelling, discharge    There were no vitals filed for this visit.    Pertinent physical exam:  left eye swelling, discharge    Brief workup plan:  eval for infection     Preliminary workup initiated; this workup will be continued and followed by the physician or advanced practice provider that is assigned to the patient when roomed.

## 2025-02-17 NOTE — ED TRIAGE NOTES
Pt states he came to ER 2 weeks ago for a bump and pain to his L eye. He was given an ointment but states he has had no relief. Pt endorses blurred vision. Pt denies HA, dizziness, weakness, numbness, tingling, and recent trauma.

## 2025-02-17 NOTE — ED PROVIDER NOTES
Encounter Date: 2/17/2025       History     Chief Complaint   Patient presents with    Eye Problem     Bump to upper eye lid for month, irritating eye with drainage     45-year-old male with no significant past medical history presents to the ED regarding left eyelid pain and swelling onset 3 weeks.  He was seen on 1/31 for these symptoms and prescribed erythromycin ointment and referral placed to ophthalmology if his symptoms do not improve.  Patient states he used the ointment and warm compresses with no relief.  States he is unaware he had a referral to Ophthalmology.  Reports intermittent blurry vision but no other visual disturbances.  No double vision, floaters, or flashes.  Denies fever, body aches, or chills.  No trauma or injury.    The history is provided by the patient and medical records.     Review of patient's allergies indicates:  No Known Allergies  History reviewed. No pertinent past medical history.  Past Surgical History:   Procedure Laterality Date    ANKLE SURGERY       No family history on file.  Social History[1]  Review of Systems  See HPI  Physical Exam     Initial Vitals [02/17/25 1547]   BP Pulse Resp Temp SpO2   (!) 144/88 76 18 98.1 °F (36.7 °C) 100 %      MAP       --         Physical Exam    Vitals reviewed.  Constitutional: He appears well-developed and well-nourished. He is not diaphoretic. No distress.   HENT:   Head: Normocephalic and atraumatic.   Eyes: EOM are normal. Pupils are equal, round, and reactive to light. Left eye exhibits hordeolum. Left eye exhibits no discharge and no exudate. No foreign body present in the left eye.   Mild localized swelling to the left upper eyelid. Pinguecula of left eye.  No periorbital erythema or edema.    Small amount uptake to inferior sclera.  No foreign body.  IOP 18 bilaterally   Neck: Neck supple.   Cardiovascular:  Normal rate.           Pulmonary/Chest: No respiratory distress.   Musculoskeletal:      Cervical back: Neck supple.      Neurological: He is alert and oriented to person, place, and time. No cranial nerve deficit.   Psychiatric: He has a normal mood and affect.         ED Course   Procedures  Labs Reviewed - No data to display       Imaging Results    None          Medications   proparacaine 0.5 % ophthalmic solution 1 drop (1 drop Both Eyes Given 2/17/25 1706)   fluorescein ophthalmic strip 1 each (1 each Both Eyes Given 2/17/25 1706)     Medical Decision Making  Emergent evaluation of 45-year-old male regarding left eyelid pain and swelling onset 3 weeks.  Vitals stable.  Clinically well-appearing, in no acute distress.  See physical exam findings above.  Findings consistent with hordeolum, possibly becoming chalazion.  Given ongoing symptoms referral placed to ophthalmology.  Vision 20/20    My differential diagnoses include but are not limited to:  Hordeolum, chalazion, corneal abrasion, corneal ulceration, doubt preseptal or orbital cellulitis  See ED course.    Risk  Prescription drug management.               ED Course as of 02/17/25 1810   Mon Feb 17, 2025 1745 Referral placed to ophthalmology and advised for close follow up.  Will trial ofloxacin drops.  Strict ED return precautions discussed with all questions answered.  He verbalized understanding and agreed to plan. [KB]   1745 Right Eye   Right Visual Status Uncorrected  Right Visual Test 20/20  Left Eye   Left Visual Status Uncorrected  Left Visual Test 20/20  Both Eyes   Both Visual Status Uncorrected  Both Visual Test 20/20 [KB]      ED Course User Index  [KB] Lucie Eng PA-C                           Clinical Impression:  Final diagnoses:  [H00.014] Hordeolum externum of left upper eyelid (Primary)          ED Disposition Condition    Discharge Stable          ED Prescriptions       Medication Sig Dispense Start Date End Date Auth. Provider    ofloxacin (OCUFLOX) 0.3 % ophthalmic solution Place 1 drop into the left eye 4 (four) times daily. 5 mL  2/17/2025 -- Lucie Eng PA-C          Follow-up Information       Follow up With Specialties Details Why Contact Info Additional Information    [facility] - [address] [place] [address] Ophthalmology Schedule an appointment as soon as possible for a visit   [address]  [facility] [address] [phone] Please arrive on the 10th floor for check-in.               [1]   Social History  Tobacco Use    Smoking status: Never    Smokeless tobacco: Never   Substance Use Topics    Alcohol use: Not Currently    Drug use: Never        Lucie Eng PA-C  02/17/25 181

## 2025-04-27 ENCOUNTER — HOSPITAL ENCOUNTER (EMERGENCY)
Facility: HOSPITAL | Age: 45
Discharge: HOME | End: 2025-04-27
Attending: EMERGENCY MEDICINE

## 2025-04-27 VITALS
BODY MASS INDEX: 28 KG/M2 | SYSTOLIC BLOOD PRESSURE: 137 MMHG | HEART RATE: 64 BPM | HEIGHT: 71 IN | DIASTOLIC BLOOD PRESSURE: 71 MMHG | RESPIRATION RATE: 17 BRPM | OXYGEN SATURATION: 98 % | WEIGHT: 200 LBS | TEMPERATURE: 98 F

## 2025-04-27 DIAGNOSIS — R07.9 CHEST PAIN: ICD-10-CM

## 2025-04-27 DIAGNOSIS — R00.2 PALPITATIONS: Primary | ICD-10-CM

## 2025-04-27 DIAGNOSIS — I49.3 PVC (PREMATURE VENTRICULAR CONTRACTION): ICD-10-CM

## 2025-04-27 LAB
ABSOLUTE EOSINOPHIL (OHS): 0.15 K/UL
ABSOLUTE MONOCYTE (OHS): 0.53 K/UL (ref 0.3–1)
ABSOLUTE NEUTROPHIL COUNT (OHS): 3.64 K/UL (ref 1.8–7.7)
ALBUMIN SERPL BCP-MCNC: 4 G/DL (ref 3.5–5.2)
ALP SERPL-CCNC: 51 UNIT/L (ref 40–150)
ALT SERPL W/O P-5'-P-CCNC: 86 UNIT/L (ref 10–44)
ANION GAP (OHS): 8 MMOL/L (ref 8–16)
AST SERPL-CCNC: 181 UNIT/L (ref 11–45)
BASOPHILS # BLD AUTO: 0.04 K/UL
BASOPHILS NFR BLD AUTO: 0.6 %
BILIRUB SERPL-MCNC: 0.6 MG/DL (ref 0.1–1)
BNP SERPL-MCNC: <10 PG/ML (ref 0–99)
BUN SERPL-MCNC: 23 MG/DL (ref 6–20)
CALCIUM SERPL-MCNC: 9.2 MG/DL (ref 8.7–10.5)
CHLORIDE SERPL-SCNC: 105 MMOL/L (ref 95–110)
CO2 SERPL-SCNC: 26 MMOL/L (ref 23–29)
CREAT SERPL-MCNC: 1.4 MG/DL (ref 0.5–1.4)
ERYTHROCYTE [DISTWIDTH] IN BLOOD BY AUTOMATED COUNT: 14.4 % (ref 11.5–14.5)
GFR SERPLBLD CREATININE-BSD FMLA CKD-EPI: >60 ML/MIN/1.73/M2
GLUCOSE SERPL-MCNC: 91 MG/DL (ref 70–110)
HCT VFR BLD AUTO: 37.9 % (ref 40–54)
HGB BLD-MCNC: 13 GM/DL (ref 14–18)
IMM GRANULOCYTES # BLD AUTO: 0.02 K/UL (ref 0–0.04)
IMM GRANULOCYTES NFR BLD AUTO: 0.3 % (ref 0–0.5)
LYMPHOCYTES # BLD AUTO: 1.82 K/UL (ref 1–4.8)
MAGNESIUM SERPL-MCNC: 2 MG/DL (ref 1.6–2.6)
MCH RBC QN AUTO: 27.9 PG (ref 27–31)
MCHC RBC AUTO-ENTMCNC: 34.3 G/DL (ref 32–36)
MCV RBC AUTO: 81 FL (ref 82–98)
NUCLEATED RBC (/100WBC) (OHS): 0 /100 WBC
OHS QRS DURATION: 92 MS
OHS QTC CALCULATION: 380 MS
PLATELET # BLD AUTO: 168 K/UL (ref 150–450)
PMV BLD AUTO: 10.4 FL (ref 9.2–12.9)
POCT GLUCOSE: 96 MG/DL (ref 70–110)
POTASSIUM SERPL-SCNC: 4 MMOL/L (ref 3.5–5.1)
PROT SERPL-MCNC: 7.3 GM/DL (ref 6–8.4)
RBC # BLD AUTO: 4.66 M/UL (ref 4.6–6.2)
RELATIVE EOSINOPHIL (OHS): 2.4 %
RELATIVE LYMPHOCYTE (OHS): 29.4 % (ref 18–48)
RELATIVE MONOCYTE (OHS): 8.5 % (ref 4–15)
RELATIVE NEUTROPHIL (OHS): 58.8 % (ref 38–73)
SODIUM SERPL-SCNC: 139 MMOL/L (ref 136–145)
TROPONIN I SERPL HS-MCNC: <3 NG/L
TROPONIN I SERPL HS-MCNC: <3 NG/L
WBC # BLD AUTO: 6.2 K/UL (ref 3.9–12.7)

## 2025-04-27 PROCEDURE — 93010 ELECTROCARDIOGRAM REPORT: CPT | Mod: ,,, | Performed by: STUDENT IN AN ORGANIZED HEALTH CARE EDUCATION/TRAINING PROGRAM

## 2025-04-27 PROCEDURE — 99285 EMERGENCY DEPT VISIT HI MDM: CPT | Mod: 25

## 2025-04-27 PROCEDURE — 85025 COMPLETE CBC W/AUTO DIFF WBC: CPT | Performed by: EMERGENCY MEDICINE

## 2025-04-27 PROCEDURE — 84484 ASSAY OF TROPONIN QUANT: CPT | Performed by: EMERGENCY MEDICINE

## 2025-04-27 PROCEDURE — 83880 ASSAY OF NATRIURETIC PEPTIDE: CPT | Performed by: EMERGENCY MEDICINE

## 2025-04-27 PROCEDURE — 93005 ELECTROCARDIOGRAM TRACING: CPT

## 2025-04-27 PROCEDURE — 83735 ASSAY OF MAGNESIUM: CPT | Performed by: EMERGENCY MEDICINE

## 2025-04-27 PROCEDURE — 80053 COMPREHEN METABOLIC PANEL: CPT | Performed by: EMERGENCY MEDICINE

## 2025-04-27 PROCEDURE — 82962 GLUCOSE BLOOD TEST: CPT

## 2025-04-27 NOTE — ED NOTES
Agus Palacios, an 45 y.o. male presents to the ED   Chief Complaint   Patient presents with    Palpitations     Pt states he has been having palpitations, 9/10 chest pain, and L sided neck pain x2 hours, pt denies N/V/D     Review of patient's allergies indicates:  No Known Allergies  Past Medical History:   Diagnosis Date    Anemia, unspecified     Anxiety disorder, unspecified     Diabetes mellitus

## 2025-04-27 NOTE — Clinical Note
"Agus"Frandy Palacios was seen and treated in our emergency department on 4/27/2025.  He may return to work on 04/29/2025.       If you have any questions or concerns, please don't hesitate to call.      Edilia Regalado MD"

## 2025-04-27 NOTE — ED PROVIDER NOTES
History:  Agus Palacios is a 45 y.o. male who presents to the ED with Palpitations (Pt states he has been having palpitations, 9/10 chest pain, and L sided neck pain x2 hours, pt denies N/V/D)    Described as 45-year-old male with a history of diabetes not on medication, anemia, anxiety presenting to the emergency department with palpitations and chest pain.  He reports that he awoke from sleep with a pinching type pain in his chest when he feels like his heart skips a beat, also with the same type of pain in his posterolateral left neck.  Symptoms last about 1 seconds before resolving.  No fevers or chills, no nausea or vomiting, no cough or congestion.    He reports he has had similar symptoms in the past, for which he underwent a stress test and echo in November which were reportedly normal.  He also had a normal Holter monitor last year.    Review of Systems: All systems reviewed and are negative except as per history of present illness.    Medications:   Previous Medications    IBUPROFEN (ADVIL,MOTRIN) 600 MG TABLET    Take 1 tablet (600 mg total) by mouth every 6 (six) hours as needed for Pain.    LIDOCAINE (LIDODERM) 5 %    Place 1 patch onto the skin once daily. Remove & Discard patch within 12 hours or as directed by MD    NAPROXEN (NAPROSYN) 500 MG TABLET    Take 1 tablet (500 mg total) by mouth 2 (two) times daily with meals.    OFLOXACIN (OCUFLOX) 0.3 % OPHTHALMIC SOLUTION    Place 1 drop into the left eye 4 (four) times daily.       PMH:   Past Medical History:   Diagnosis Date    Anemia, unspecified     Anxiety disorder, unspecified     Diabetes mellitus      PSH:   Past Surgical History:   Procedure Laterality Date    ANKLE SURGERY       Allergies: He has no known allergies.  Social History: Marital Status: significant other. He  reports that he has never smoked. He has never used smokeless tobacco.. He  reports that he does not currently use alcohol..       Exam:  VITAL SIGNS:   Vitals:    04/27/25  "0135 04/27/25 0149 04/27/25 0200 04/27/25 0302   BP: 134/72 131/76 125/75 131/64   Pulse: 62  (!) 57 71   Resp: 18  14 14   Temp: 98 °F (36.7 °C)      TempSrc: Oral      SpO2: 99%  98% 100%   Weight: 90.7 kg (200 lb)      Height: 5' 11" (1.803 m)        Const: Awake and alert, NAD  Head: Atraumatic  Eyes: Normal Conjunctiva  ENT: Normal External Ears, Nose and Mouth.  Neck: Full range of motion. No meningismus.  Resp: Normal respiratory effort, No distress, CTAB  Cardio: Equal and intact distal pulses, RRR  Abd: Soft, non tender, non distended.   Skin: No petechiae or rashes  Ext: No cyanosis, or edema  Neur: Awake and alert  Psych: Normal Mood and Affect    Data:  Results for orders placed or performed during the hospital encounter of 04/27/25   Comprehensive metabolic panel    Collection Time: 04/27/25  2:06 AM   Result Value Ref Range    Sodium 139 136 - 145 mmol/L    Potassium 4.0 3.5 - 5.1 mmol/L    Chloride 105 95 - 110 mmol/L    CO2 26 23 - 29 mmol/L    Glucose 91 70 - 110 mg/dL    BUN 23 (H) 6 - 20 mg/dL    Creatinine 1.4 0.5 - 1.4 mg/dL    Calcium 9.2 8.7 - 10.5 mg/dL    Protein Total 7.3 6.0 - 8.4 gm/dL    Albumin 4.0 3.5 - 5.2 g/dL    Bilirubin Total 0.6 0.1 - 1.0 mg/dL    ALP 51 40 - 150 unit/L     (H) 11 - 45 unit/L    ALT 86 (H) 10 - 44 unit/L    Anion Gap 8 8 - 16 mmol/L    eGFR >60 >60 mL/min/1.73/m2   Troponin I High Sensitivity #1    Collection Time: 04/27/25  2:06 AM   Result Value Ref Range    Troponin High Sensitive <3 <=35 ng/L   Troponin I High Sensitivity #2    Collection Time: 04/27/25  2:06 AM   Result Value Ref Range    Troponin High Sensitive <3 <=35 ng/L   B-Type natriuretic peptide (BNP)    Collection Time: 04/27/25  2:06 AM   Result Value Ref Range    BNP <10 0 - 99 pg/mL   CBC with Differential    Collection Time: 04/27/25  2:06 AM   Result Value Ref Range    WBC 6.20 3.90 - 12.70 K/uL    RBC 4.66 4.60 - 6.20 M/uL    HGB 13.0 (L) 14.0 - 18.0 gm/dL    HCT 37.9 (L) 40.0 - 54.0 % " "   MCV 81 (L) 82 - 98 fL    MCH 27.9 27.0 - 31.0 pg    MCHC 34.3 32.0 - 36.0 g/dL    RDW 14.4 11.5 - 14.5 %    Platelet Count 168 150 - 450 K/uL    MPV 10.4 9.2 - 12.9 fL    Nucleated RBC 0 <=0 /100 WBC    Neut % 58.8 38 - 73 %    Lymph % 29.4 18 - 48 %    Mono % 8.5 4 - 15 %    Eos % 2.4 <=8 %    Basophil % 0.6 <=1.9 %    Imm Grans % 0.3 0.0 - 0.5 %    Neut # 3.64 1.8 - 7.7 K/uL    Lymph # 1.82 1 - 4.8 K/uL    Mono # 0.53 0.3 - 1 K/uL    Eos # 0.15 <=0.5 K/uL    Baso # 0.04 <=0.2 K/uL    Imm Grans # 0.02 0.00 - 0.04 K/uL   Magnesium    Collection Time: 25  2:06 AM   Result Value Ref Range    Magnesium  2.0 1.6 - 2.6 mg/dL   POCT glucose    Collection Time: 25  2:12 AM   Result Value Ref Range    POCT Glucose 96 70 - 110 mg/dL     Imaging Results              X-Ray Chest AP Portable (Final result)  Result time 25 03:02:23      Final result by Sammy Bradford MD (25 03:02:23)                   Impression:      No acute cardiopulmonary finding identified on this single view.      Electronically signed by: Sammy Bradford MD  Date:    2025  Time:    03:02               Narrative:    EXAMINATION:  XR CHEST AP PORTABLE    CLINICAL HISTORY:  Provided history is "Chest Pain;  ".    TECHNIQUE:  One view of the chest.    COMPARISON:  2025.    FINDINGS:  Cardiac wires overlie the chest.  Cardiac silhouette is not enlarged.  No focal consolidation.  No sizable pleural effusion.  No pneumothorax.                                    12-LEAD EKG INTERPRETATION BY ME:  Rate/Rhythm: Sinus bradycardia with rate of 56 beats per minute  QRS, ST, T-waves: No changes consistent with acute ischemia  Impression: Sinus bradycardia    Labs & Imaging studies were reviewed independently by me.     Medical Decision Makin-year-old male presenting to the emergency department complaining of palpitations as well as atypical chest pain when he feels as though his heart skips a beat lasting less than 2nd. "  He has occasional PVCs on cardiac monitor, likely the palpitations he is feeling, though these are not particularly frequent or concerning in nature.  EKG shows no acute ischemic changes.  Electrolytes are normal.  He is well-appearing with normal heart sounds and breath sounds on examination.   I considered high risk diagnoses such as acute myocardial infarction, pulmonary embolism, aortic dissection, pericarditis/myocarditis, and pneumonia among other diagnoses and I reviewed the ECG for ischemia, arrhythmia, myocardial disease or other cardiac abnormality.    I independently reviewed the labs and CXR images with CXR showing no evidence of pneumonia, pneumothorax or widened mediastinum.    Pulmonary embolism unlikely for the following reasons: PERC negative    Dissection unlikely and no further work-up performed for the following reasons: low probability by history/physical.    Pericarditis unlikely as no evidence on history, PE or EKG.    Doubt ACS given atypical symptoms, troponin less than 3 taken at 3 hours from symptom onset with recent negative stress test and echo.  He does report his Holter monitor testing was about a year ago, encouraged to follow up with his primary physician for possible repeat Holter monitor at home, as he only has occasional PVCs on the monitor here.  Labs do show mild elevation in his liver function tests, encouraged to follow up with his primary physician for repeat testing.  Hemoglobin slightly low at 13, stable at baseline, no signs of active bleeding.  Strict return precautions were discussed, and patient is agreeable with the plan.      Clinical Impression:  1. Palpitations    2. Chest pain    3. PVC (premature ventricular contraction)             Medication List        ASK your doctor about these medications      ibuprofen 600 MG tablet  Commonly known as: ADVIL,MOTRIN  Take 1 tablet (600 mg total) by mouth every 6 (six) hours as needed for Pain.     LIDOcaine 5 %  Commonly  known as: LIDODERM  Place 1 patch onto the skin once daily. Remove & Discard patch within 12 hours or as directed by MD     naproxen 500 MG tablet  Commonly known as: NAPROSYN  Take 1 tablet (500 mg total) by mouth 2 (two) times daily with meals.     ofloxacin 0.3 % ophthalmic solution  Commonly known as: OCUFLOX  Place 1 drop into the left eye 4 (four) times daily.              Follow-up Information       Your PCP.    Why: Ask about a Holter monitor.                             Edilia Regalado MD  04/27/25 [id number]

## 2025-04-27 NOTE — ED NOTES
Pt placed on cardiac monitor, continuous pulse ox, cycling blood pressures. Side rails up x2, call bell in reach, bed in low position with brake engaged.